# Patient Record
Sex: MALE | Race: WHITE | NOT HISPANIC OR LATINO | Employment: OTHER | URBAN - NONMETROPOLITAN AREA
[De-identification: names, ages, dates, MRNs, and addresses within clinical notes are randomized per-mention and may not be internally consistent; named-entity substitution may affect disease eponyms.]

---

## 2019-09-24 ENCOUNTER — OFFICE VISIT (OUTPATIENT)
Dept: NEUROSURGERY | Facility: CLINIC | Age: 59
End: 2019-09-24

## 2019-09-24 DIAGNOSIS — M47.816 FACET ARTHRITIS OF LUMBAR REGION: ICD-10-CM

## 2019-09-24 DIAGNOSIS — M71.30 SYNOVIAL CYST: Primary | ICD-10-CM

## 2019-09-24 PROCEDURE — 99203 OFFICE O/P NEW LOW 30 MIN: CPT | Performed by: NEUROLOGICAL SURGERY

## 2019-09-24 NOTE — PROGRESS NOTES
Subjective   Patient ID: Delonte Hernandes is a 59 y.o. male is being seen for consultation today at the request of Felipa uLnd MD  Chief Complaint: Back and leg pain    History of Present Illness: The patient is a 59-year-old man from near [place] on [facility] with a history of pain in the back radiating to his hips and legs for 6 to 12 months and steadily worsening as time goes on.  He can walk only about 1/4 mile.  Pain on the left is more than the right.  It feels like his legs are pulling him down when he walks.  Everything he does bothers it.  It is improved by sitting and resting.  He has been disabled for about 7 years.    He has a history of hepatitis and diabetes and hypertension.    Review of Radiographic Studies:  Lumbar MRI scan shows a central and left-sided very large intraspinal facet synovial cyst that appears to arise from the left L4-5 facet joint with marked compression and displacement of the dura anteriorly and to the right.  The facet joints at L4-5 are widely , but there is no spondylolisthesis and the disc spaces full with no significant degeneration or subluxation.    The following portions of the patient's history were reviewed, updated as appropriate and approved: allergies, current medications, past family history, past medical history, past social history, past surgical history, review of systems and problem list.    Review of Systems   Constitutional: Negative for activity change, appetite change, chills, diaphoresis, fatigue, fever and unexpected weight change.   HENT: Negative for congestion, dental problem, drooling, ear discharge, ear pain, facial swelling, hearing loss, mouth sores, nosebleeds, postnasal drip, rhinorrhea, sinus pressure, sneezing, sore throat, tinnitus, trouble swallowing and voice change.    Eyes: Negative for photophobia, pain, discharge, redness, itching and visual disturbance.   Respiratory: Negative for apnea, cough, choking, chest  tightness, shortness of breath, wheezing and stridor.    Cardiovascular: Negative for chest pain, palpitations and leg swelling.   Gastrointestinal: Negative for abdominal distention, abdominal pain, anal bleeding, blood in stool, constipation, diarrhea, nausea, rectal pain and vomiting.   Endocrine: Negative for cold intolerance, heat intolerance, polydipsia, polyphagia and polyuria.   Genitourinary: Negative for decreased urine volume, difficulty urinating, dysuria, enuresis, flank pain, frequency, genital sores, hematuria and urgency.   Musculoskeletal: Positive for arthralgias, back pain, gait problem, joint swelling, myalgias, neck pain and neck stiffness.   Skin: Negative for color change, pallor, rash and wound.   Allergic/Immunologic: Negative for environmental allergies, food allergies and immunocompromised state.   Neurological: Positive for dizziness and weakness. Negative for tremors, seizures, syncope, facial asymmetry, speech difficulty, light-headedness, numbness and headaches.   Hematological: Negative for adenopathy. Does not bruise/bleed easily.   Psychiatric/Behavioral: Negative for agitation, behavioral problems, confusion, decreased concentration, dysphoric mood, hallucinations, self-injury, sleep disturbance and suicidal ideas. The patient is not nervous/anxious and is not hyperactive.        Objective     NEUROLOGICAL EXAMINATION:      MENTAL STATUS:  Alert and oriented.  Speech intact.  Recent and remote memory intact.      CRANIAL NERVES:  Cranial nerve II:  Visual fields are full.  Cranial nerves III, IV and VI:  PERRLADC.  Extraocular movements are intact.  Nystagmus is not present.  Cranial nerve V:  Facial sensation is intact.  Cranial nerve VII:  Muscles of facial expression reveal no asymmetry.  Cranial nerve VIII:  Hearing is intact.  Cranial nerves IX and X:  Palate elevates symmetrically.  Cranial nerve XI:  Shoulder shrug is intact.  Cranial nerve XII:  Tongue is midline without  evidence of atrophy or fasciculation.    MUSCULOSKELETAL:  SLR negative bilaterally    MOTOR: Intact dorsiflexion, plantarflexion, knee extension bilaterally    SENSATION: Intact to touch over both lower extremities    REFLEXES:  DTR trace in both knees and both ankles      Assessment   Severe lumbar stenosis L4-5 due to large facet synovial cyst arising from the left facet, but extending across the midline to the right.       Plan   Minimal access decompressive laminectomy and excision of epidural synovial cyst L4-5, left-sided approach.       Nav Douglass MD

## 2019-10-04 PROBLEM — M47.816 FACET ARTHRITIS OF LUMBAR REGION: Status: ACTIVE | Noted: 2019-10-04

## 2019-10-07 ENCOUNTER — PREP FOR SURGERY (OUTPATIENT)
Dept: OTHER | Facility: HOSPITAL | Age: 59
End: 2019-10-07

## 2019-10-07 DIAGNOSIS — M47.816 FACET ARTHRITIS OF LUMBAR REGION: Primary | ICD-10-CM

## 2019-10-07 RX ORDER — SODIUM CHLORIDE AND POTASSIUM CHLORIDE 150; 900 MG/100ML; MG/100ML
50 INJECTION, SOLUTION INTRAVENOUS CONTINUOUS
Status: CANCELLED | OUTPATIENT
Start: 2019-10-07

## 2019-10-07 RX ORDER — SODIUM CHLORIDE 0.9 % (FLUSH) 0.9 %
3 SYRINGE (ML) INJECTION EVERY 12 HOURS SCHEDULED
Status: CANCELLED | OUTPATIENT
Start: 2019-10-07

## 2019-10-07 RX ORDER — IBUPROFEN 800 MG/1
800 TABLET ORAL ONCE
Status: CANCELLED | OUTPATIENT
Start: 2019-10-07 | End: 2019-10-07

## 2019-10-07 RX ORDER — CEFAZOLIN SODIUM 2 G/100ML
2 INJECTION, SOLUTION INTRAVENOUS ONCE
Status: CANCELLED | OUTPATIENT
Start: 2019-10-07 | End: 2019-10-07

## 2019-10-07 RX ORDER — ACETAMINOPHEN 325 MG/1
650 TABLET ORAL ONCE
Status: CANCELLED | OUTPATIENT
Start: 2019-10-07 | End: 2019-10-07

## 2019-10-07 RX ORDER — HYDROCODONE BITARTRATE AND ACETAMINOPHEN 7.5; 325 MG/1; MG/1
1 TABLET ORAL ONCE
Status: CANCELLED | OUTPATIENT
Start: 2019-10-07 | End: 2019-10-07

## 2019-10-07 RX ORDER — CHLORHEXIDINE GLUCONATE 4 G/100ML
SOLUTION TOPICAL
Qty: 120 ML | Refills: 0 | Status: SHIPPED | OUTPATIENT
Start: 2019-10-07

## 2019-11-01 ENCOUNTER — APPOINTMENT (OUTPATIENT)
Dept: PREADMISSION TESTING | Facility: HOSPITAL | Age: 59
End: 2019-11-01

## 2019-11-01 VITALS — WEIGHT: 223.99 LBS | HEIGHT: 68 IN | BODY MASS INDEX: 33.95 KG/M2

## 2019-11-01 DIAGNOSIS — M47.816 FACET ARTHRITIS OF LUMBAR REGION: ICD-10-CM

## 2019-11-01 LAB
ANION GAP SERPL CALCULATED.3IONS-SCNC: 11 MMOL/L (ref 5–15)
BUN BLD-MCNC: 13 MG/DL (ref 6–20)
BUN/CREAT SERPL: 11.3 (ref 7–25)
CALCIUM SPEC-SCNC: 9.8 MG/DL (ref 8.6–10.5)
CHLORIDE SERPL-SCNC: 95 MMOL/L (ref 98–107)
CO2 SERPL-SCNC: 28 MMOL/L (ref 22–29)
CREAT BLD-MCNC: 1.15 MG/DL (ref 0.76–1.27)
DEPRECATED RDW RBC AUTO: 38.7 FL (ref 37–54)
ERYTHROCYTE [DISTWIDTH] IN BLOOD BY AUTOMATED COUNT: 12.1 % (ref 12.3–15.4)
GFR SERPL CREATININE-BSD FRML MDRD: 65 ML/MIN/1.73
GLUCOSE BLD-MCNC: 176 MG/DL (ref 65–99)
HCT VFR BLD AUTO: 39.9 % (ref 37.5–51)
HGB BLD-MCNC: 13.3 G/DL (ref 13–17.7)
MCH RBC QN AUTO: 29.1 PG (ref 26.6–33)
MCHC RBC AUTO-ENTMCNC: 33.3 G/DL (ref 31.5–35.7)
MCV RBC AUTO: 87.3 FL (ref 79–97)
MRSA DNA SPEC QL NAA+PROBE: NEGATIVE
PLATELET # BLD AUTO: 310 10*3/MM3 (ref 140–450)
PMV BLD AUTO: 9.7 FL (ref 6–12)
POTASSIUM BLD-SCNC: 4.7 MMOL/L (ref 3.5–5.2)
RBC # BLD AUTO: 4.57 10*6/MM3 (ref 4.14–5.8)
SODIUM BLD-SCNC: 134 MMOL/L (ref 136–145)
WBC NRBC COR # BLD: 9.64 10*3/MM3 (ref 3.4–10.8)

## 2019-11-01 PROCEDURE — 93010 ELECTROCARDIOGRAM REPORT: CPT | Performed by: INTERNAL MEDICINE

## 2019-11-01 PROCEDURE — 93005 ELECTROCARDIOGRAM TRACING: CPT

## 2019-11-01 PROCEDURE — 80048 BASIC METABOLIC PNL TOTAL CA: CPT | Performed by: NEUROLOGICAL SURGERY

## 2019-11-01 PROCEDURE — 36415 COLL VENOUS BLD VENIPUNCTURE: CPT

## 2019-11-01 PROCEDURE — 87641 MR-STAPH DNA AMP PROBE: CPT | Performed by: NEUROLOGICAL SURGERY

## 2019-11-01 PROCEDURE — 85027 COMPLETE CBC AUTOMATED: CPT | Performed by: NEUROLOGICAL SURGERY

## 2019-11-01 RX ORDER — OXYCODONE AND ACETAMINOPHEN 7.5; 325 MG/1; MG/1
1 TABLET ORAL EVERY 6 HOURS PRN
COMMUNITY

## 2019-11-01 RX ORDER — METOPROLOL TARTRATE 50 MG/1
50 TABLET, FILM COATED ORAL 2 TIMES DAILY
COMMUNITY

## 2019-11-01 RX ORDER — GLIMEPIRIDE 1 MG/1
1 TABLET ORAL
COMMUNITY

## 2019-11-01 RX ORDER — FUROSEMIDE 20 MG/1
20 TABLET ORAL DAILY
COMMUNITY

## 2019-11-01 RX ORDER — AMLODIPINE BESYLATE 10 MG/1
10 TABLET ORAL DAILY
COMMUNITY

## 2019-11-01 RX ORDER — LISINOPRIL 40 MG/1
40 TABLET ORAL 2 TIMES DAILY
COMMUNITY

## 2019-11-01 RX ORDER — ASPIRIN 81 MG/1
81 TABLET ORAL DAILY
COMMUNITY

## 2019-11-01 RX ORDER — OMEPRAZOLE 20 MG/1
20 CAPSULE, DELAYED RELEASE ORAL DAILY
COMMUNITY

## 2019-11-01 RX ORDER — INSULIN GLARGINE 100 [IU]/ML
35 INJECTION, SOLUTION SUBCUTANEOUS DAILY
COMMUNITY

## 2019-11-01 NOTE — PAT
Patient instructed to drink 20 ounces (or until full) of Gatorade and it needs to be completed 1 hour before given arrival time for procedure (NO RED Gatorade)    Patient verbalized understanding.      Verified with patient that they received a prescription for Bactroban and Chlorhexidine shower from the office.  Reinforced with them to fill the prescription if they haven't already.  Verbal and written instructions given regarding proper use of the Bactroban and Chlorhexidine to patient and/or famlily during PAT visit. Patient/family also instructed to complete checklist and return it to Pre-op on the day of surgery.  Patient and/or family verbalized understanding.    Per Anesthesia Request, patient instructed not to take their ACE/ARB medications on the AM of surgery.        Pt wasn't informed that he was having a laminectomy done so wants to talk to dr deonna dawkins before signing consent.       ekg cleared by dr tong

## 2019-11-08 ENCOUNTER — APPOINTMENT (OUTPATIENT)
Dept: GENERAL RADIOLOGY | Facility: HOSPITAL | Age: 59
End: 2019-11-08

## 2019-11-08 ENCOUNTER — HOSPITAL ENCOUNTER (INPATIENT)
Facility: HOSPITAL | Age: 59
LOS: 1 days | Discharge: HOME | End: 2019-11-09
Attending: NEUROLOGICAL SURGERY | Admitting: NEUROLOGICAL SURGERY

## 2019-11-08 ENCOUNTER — ANESTHESIA (OUTPATIENT)
Dept: PERIOP | Facility: HOSPITAL | Age: 59
End: 2019-11-08

## 2019-11-08 ENCOUNTER — ANESTHESIA EVENT (OUTPATIENT)
Dept: PERIOP | Facility: HOSPITAL | Age: 59
End: 2019-11-08

## 2019-11-08 DIAGNOSIS — M71.30 SYNOVIAL CYST: ICD-10-CM

## 2019-11-08 DIAGNOSIS — M47.816 FACET ARTHRITIS OF LUMBAR REGION: ICD-10-CM

## 2019-11-08 LAB
GLUCOSE BLDC GLUCOMTR-MCNC: 218 MG/DL (ref 70–130)
GLUCOSE BLDC GLUCOMTR-MCNC: 226 MG/DL (ref 70–130)
GLUCOSE BLDC GLUCOMTR-MCNC: 244 MG/DL (ref 70–130)
POTASSIUM BLD-SCNC: 4.4 MMOL/L (ref 3.5–5.2)

## 2019-11-08 PROCEDURE — A9270 NON-COVERED ITEM OR SERVICE: HCPCS | Performed by: NEUROLOGICAL SURGERY

## 2019-11-08 PROCEDURE — 25010000003 CEFAZOLIN IN DEXTROSE 2-4 GM/100ML-% SOLUTION: Performed by: NEUROLOGICAL SURGERY

## 2019-11-08 PROCEDURE — 25010000002 METHYLPREDNISOLONE PER 40 MG: Performed by: NEUROLOGICAL SURGERY

## 2019-11-08 PROCEDURE — 25010000002 NEOSTIGMINE 10 MG/10ML SOLUTION: Performed by: NURSE ANESTHETIST, CERTIFIED REGISTERED

## 2019-11-08 PROCEDURE — 76000 FLUOROSCOPY <1 HR PHYS/QHP: CPT

## 2019-11-08 PROCEDURE — 01NB0ZZ RELEASE LUMBAR NERVE, OPEN APPROACH: ICD-10-PCS | Performed by: NEUROLOGICAL SURGERY

## 2019-11-08 PROCEDURE — 63710000001 METOPROLOL TARTRATE 50 MG TABLET: Performed by: NEUROLOGICAL SURGERY

## 2019-11-08 PROCEDURE — 25010000002 ONDANSETRON PER 1 MG: Performed by: NURSE ANESTHETIST, CERTIFIED REGISTERED

## 2019-11-08 PROCEDURE — 25010000002 HYDROMORPHONE 1 MG/ML SOLUTION: Performed by: NEUROLOGICAL SURGERY

## 2019-11-08 PROCEDURE — 63710000001 OXYCODONE-ACETAMINOPHEN 10-325 MG TABLET: Performed by: NEUROLOGICAL SURGERY

## 2019-11-08 PROCEDURE — 63710000001 INSULIN REGULAR HUMAN PER 5 UNITS: Performed by: ANESTHESIOLOGY

## 2019-11-08 PROCEDURE — 63267 EXCISE INTRSPINL LESION LMBR: CPT | Performed by: NEUROLOGICAL SURGERY

## 2019-11-08 PROCEDURE — S0260 H&P FOR SURGERY: HCPCS | Performed by: NURSE PRACTITIONER

## 2019-11-08 PROCEDURE — 25010000002 FENTANYL CITRATE (PF) 100 MCG/2ML SOLUTION: Performed by: NURSE ANESTHETIST, CERTIFIED REGISTERED

## 2019-11-08 PROCEDURE — 25810000003 SODIUM CHLORIDE 0.9 % WITH KCL 20 MEQ 20-0.9 MEQ/L-% SOLUTION: Performed by: NEUROLOGICAL SURGERY

## 2019-11-08 PROCEDURE — 63710000001 ASPIRIN 81 MG TABLET DELAYED-RELEASE: Performed by: NEUROLOGICAL SURGERY

## 2019-11-08 PROCEDURE — 63710000001 METFORMIN 1000 MG TABLET: Performed by: NEUROLOGICAL SURGERY

## 2019-11-08 PROCEDURE — 88304 TISSUE EXAM BY PATHOLOGIST: CPT | Performed by: NEUROLOGICAL SURGERY

## 2019-11-08 PROCEDURE — 25010000002 FENTANYL CITRATE (PF) 100 MCG/2ML SOLUTION: Performed by: ANESTHESIOLOGY

## 2019-11-08 PROCEDURE — 82962 GLUCOSE BLOOD TEST: CPT

## 2019-11-08 PROCEDURE — 84132 ASSAY OF SERUM POTASSIUM: CPT | Performed by: ANESTHESIOLOGY

## 2019-11-08 PROCEDURE — G0378 HOSPITAL OBSERVATION PER HR: HCPCS

## 2019-11-08 PROCEDURE — 25010000002 PROPOFOL 10 MG/ML EMULSION: Performed by: NURSE ANESTHETIST, CERTIFIED REGISTERED

## 2019-11-08 PROCEDURE — 63710000001 HYDROCODONE-ACETAMINOPHEN 7.5-325 MG TABLET: Performed by: NEUROLOGICAL SURGERY

## 2019-11-08 PROCEDURE — 63710000001 AMLODIPINE 10 MG TABLET: Performed by: NEUROLOGICAL SURGERY

## 2019-11-08 PROCEDURE — 25010000002 ONDANSETRON PER 1 MG: Performed by: NEUROLOGICAL SURGERY

## 2019-11-08 RX ORDER — ONDANSETRON 4 MG/1
4 TABLET, FILM COATED ORAL EVERY 6 HOURS PRN
Status: DISCONTINUED | OUTPATIENT
Start: 2019-11-08 | End: 2019-11-09 | Stop reason: HOSPADM

## 2019-11-08 RX ORDER — AMLODIPINE BESYLATE 10 MG/1
10 TABLET ORAL DAILY
Status: DISCONTINUED | OUTPATIENT
Start: 2019-11-08 | End: 2019-11-09 | Stop reason: HOSPADM

## 2019-11-08 RX ORDER — FUROSEMIDE 20 MG/1
20 TABLET ORAL DAILY
Status: DISCONTINUED | OUTPATIENT
Start: 2019-11-08 | End: 2019-11-09 | Stop reason: HOSPADM

## 2019-11-08 RX ORDER — SODIUM CHLORIDE 0.9 % (FLUSH) 0.9 %
3 SYRINGE (ML) INJECTION EVERY 12 HOURS SCHEDULED
Status: DISCONTINUED | OUTPATIENT
Start: 2019-11-08 | End: 2019-11-08 | Stop reason: HOSPADM

## 2019-11-08 RX ORDER — NALOXONE HCL 0.4 MG/ML
0.4 VIAL (ML) INJECTION
Status: DISCONTINUED | OUTPATIENT
Start: 2019-11-08 | End: 2019-11-09 | Stop reason: HOSPADM

## 2019-11-08 RX ORDER — GLIPIZIDE 5 MG/1
2.5 TABLET ORAL
Status: DISCONTINUED | OUTPATIENT
Start: 2019-11-09 | End: 2019-11-09 | Stop reason: HOSPADM

## 2019-11-08 RX ORDER — LIDOCAINE HYDROCHLORIDE 10 MG/ML
0.5 INJECTION, SOLUTION EPIDURAL; INFILTRATION; INTRACAUDAL; PERINEURAL ONCE AS NEEDED
Status: COMPLETED | OUTPATIENT
Start: 2019-11-08 | End: 2019-11-08

## 2019-11-08 RX ORDER — LISINOPRIL 40 MG/1
40 TABLET ORAL 2 TIMES DAILY
Status: DISCONTINUED | OUTPATIENT
Start: 2019-11-08 | End: 2019-11-09 | Stop reason: HOSPADM

## 2019-11-08 RX ORDER — OXYCODONE AND ACETAMINOPHEN 7.5; 325 MG/1; MG/1
1 TABLET ORAL ONCE AS NEEDED
Status: DISCONTINUED | OUTPATIENT
Start: 2019-11-08 | End: 2019-11-08 | Stop reason: HOSPADM

## 2019-11-08 RX ORDER — CYCLOBENZAPRINE HCL 10 MG
10 TABLET ORAL 3 TIMES DAILY PRN
Status: DISCONTINUED | OUTPATIENT
Start: 2019-11-08 | End: 2019-11-09 | Stop reason: HOSPADM

## 2019-11-08 RX ORDER — HYDROMORPHONE HYDROCHLORIDE 1 MG/ML
0.5 INJECTION, SOLUTION INTRAMUSCULAR; INTRAVENOUS; SUBCUTANEOUS
Status: DISCONTINUED | OUTPATIENT
Start: 2019-11-08 | End: 2019-11-08 | Stop reason: HOSPADM

## 2019-11-08 RX ORDER — LABETALOL HYDROCHLORIDE 5 MG/ML
5 INJECTION, SOLUTION INTRAVENOUS
Status: DISCONTINUED | OUTPATIENT
Start: 2019-11-08 | End: 2019-11-08 | Stop reason: HOSPADM

## 2019-11-08 RX ORDER — METHYLPREDNISOLONE ACETATE 40 MG/ML
INJECTION, SUSPENSION INTRA-ARTICULAR; INTRALESIONAL; INTRAMUSCULAR; SOFT TISSUE AS NEEDED
Status: DISCONTINUED | OUTPATIENT
Start: 2019-11-08 | End: 2019-11-08 | Stop reason: HOSPADM

## 2019-11-08 RX ORDER — FENTANYL CITRATE 50 UG/ML
INJECTION, SOLUTION INTRAMUSCULAR; INTRAVENOUS AS NEEDED
Status: DISCONTINUED | OUTPATIENT
Start: 2019-11-08 | End: 2019-11-08 | Stop reason: SURG

## 2019-11-08 RX ORDER — ATRACURIUM BESYLATE 10 MG/ML
INJECTION, SOLUTION INTRAVENOUS AS NEEDED
Status: DISCONTINUED | OUTPATIENT
Start: 2019-11-08 | End: 2019-11-08 | Stop reason: SURG

## 2019-11-08 RX ORDER — NEOSTIGMINE METHYLSULFATE 1 MG/ML
INJECTION, SOLUTION INTRAVENOUS AS NEEDED
Status: DISCONTINUED | OUTPATIENT
Start: 2019-11-08 | End: 2019-11-08 | Stop reason: SURG

## 2019-11-08 RX ORDER — MAGNESIUM HYDROXIDE 1200 MG/15ML
LIQUID ORAL AS NEEDED
Status: DISCONTINUED | OUTPATIENT
Start: 2019-11-08 | End: 2019-11-08 | Stop reason: HOSPADM

## 2019-11-08 RX ORDER — HYDROCODONE BITARTRATE AND ACETAMINOPHEN 7.5; 325 MG/1; MG/1
1 TABLET ORAL EVERY 4 HOURS PRN
Status: DISCONTINUED | OUTPATIENT
Start: 2019-11-08 | End: 2019-11-09 | Stop reason: HOSPADM

## 2019-11-08 RX ORDER — IBUPROFEN 800 MG/1
800 TABLET ORAL ONCE
Status: COMPLETED | OUTPATIENT
Start: 2019-11-08 | End: 2019-11-08

## 2019-11-08 RX ORDER — PROPOFOL 10 MG/ML
VIAL (ML) INTRAVENOUS AS NEEDED
Status: DISCONTINUED | OUTPATIENT
Start: 2019-11-08 | End: 2019-11-08 | Stop reason: SURG

## 2019-11-08 RX ORDER — SODIUM CHLORIDE AND POTASSIUM CHLORIDE 150; 900 MG/100ML; MG/100ML
50 INJECTION, SOLUTION INTRAVENOUS CONTINUOUS
Status: DISCONTINUED | OUTPATIENT
Start: 2019-11-08 | End: 2019-11-09 | Stop reason: HOSPADM

## 2019-11-08 RX ORDER — BUPIVACAINE HYDROCHLORIDE AND EPINEPHRINE 2.5; 5 MG/ML; UG/ML
INJECTION, SOLUTION EPIDURAL; INFILTRATION; INTRACAUDAL; PERINEURAL AS NEEDED
Status: DISCONTINUED | OUTPATIENT
Start: 2019-11-08 | End: 2019-11-08 | Stop reason: HOSPADM

## 2019-11-08 RX ORDER — OXYCODONE AND ACETAMINOPHEN 10; 325 MG/1; MG/1
1 TABLET ORAL EVERY 4 HOURS PRN
Status: DISCONTINUED | OUTPATIENT
Start: 2019-11-08 | End: 2019-11-09 | Stop reason: HOSPADM

## 2019-11-08 RX ORDER — PANTOPRAZOLE SODIUM 40 MG/1
40 TABLET, DELAYED RELEASE ORAL EVERY MORNING
Status: DISCONTINUED | OUTPATIENT
Start: 2019-11-09 | End: 2019-11-09 | Stop reason: HOSPADM

## 2019-11-08 RX ORDER — FENTANYL CITRATE 50 UG/ML
50 INJECTION, SOLUTION INTRAMUSCULAR; INTRAVENOUS
Status: DISCONTINUED | OUTPATIENT
Start: 2019-11-08 | End: 2019-11-08 | Stop reason: HOSPADM

## 2019-11-08 RX ORDER — HYDROCODONE BITARTRATE AND ACETAMINOPHEN 7.5; 325 MG/1; MG/1
1 TABLET ORAL ONCE
Status: COMPLETED | OUTPATIENT
Start: 2019-11-08 | End: 2019-11-08

## 2019-11-08 RX ORDER — ACETAMINOPHEN 325 MG/1
650 TABLET ORAL EVERY 4 HOURS PRN
Status: DISCONTINUED | OUTPATIENT
Start: 2019-11-08 | End: 2019-11-09 | Stop reason: HOSPADM

## 2019-11-08 RX ORDER — TEMAZEPAM 15 MG/1
15 CAPSULE ORAL NIGHTLY PRN
Status: DISCONTINUED | OUTPATIENT
Start: 2019-11-08 | End: 2019-11-09 | Stop reason: HOSPADM

## 2019-11-08 RX ORDER — ONDANSETRON 2 MG/ML
INJECTION INTRAMUSCULAR; INTRAVENOUS AS NEEDED
Status: DISCONTINUED | OUTPATIENT
Start: 2019-11-08 | End: 2019-11-08 | Stop reason: SURG

## 2019-11-08 RX ORDER — ONDANSETRON 2 MG/ML
4 INJECTION INTRAMUSCULAR; INTRAVENOUS EVERY 6 HOURS PRN
Status: DISCONTINUED | OUTPATIENT
Start: 2019-11-08 | End: 2019-11-09 | Stop reason: HOSPADM

## 2019-11-08 RX ORDER — ASPIRIN 81 MG/1
81 TABLET ORAL DAILY
Status: DISCONTINUED | OUTPATIENT
Start: 2019-11-08 | End: 2019-11-09 | Stop reason: HOSPADM

## 2019-11-08 RX ORDER — SODIUM CHLORIDE 0.9 % (FLUSH) 0.9 %
3-10 SYRINGE (ML) INJECTION AS NEEDED
Status: DISCONTINUED | OUTPATIENT
Start: 2019-11-08 | End: 2019-11-08 | Stop reason: HOSPADM

## 2019-11-08 RX ORDER — SODIUM CHLORIDE, SODIUM LACTATE, POTASSIUM CHLORIDE, CALCIUM CHLORIDE 600; 310; 30; 20 MG/100ML; MG/100ML; MG/100ML; MG/100ML
9 INJECTION, SOLUTION INTRAVENOUS CONTINUOUS
Status: DISCONTINUED | OUTPATIENT
Start: 2019-11-08 | End: 2019-11-09 | Stop reason: HOSPADM

## 2019-11-08 RX ORDER — GLYCOPYRROLATE 0.2 MG/ML
INJECTION INTRAMUSCULAR; INTRAVENOUS AS NEEDED
Status: DISCONTINUED | OUTPATIENT
Start: 2019-11-08 | End: 2019-11-08 | Stop reason: SURG

## 2019-11-08 RX ORDER — METOPROLOL TARTRATE 50 MG/1
50 TABLET, FILM COATED ORAL EVERY 12 HOURS SCHEDULED
Status: DISCONTINUED | OUTPATIENT
Start: 2019-11-08 | End: 2019-11-09 | Stop reason: HOSPADM

## 2019-11-08 RX ORDER — FAMOTIDINE 20 MG/1
20 TABLET, FILM COATED ORAL ONCE
Status: COMPLETED | OUTPATIENT
Start: 2019-11-08 | End: 2019-11-08

## 2019-11-08 RX ORDER — CEFAZOLIN SODIUM 2 G/100ML
2 INJECTION, SOLUTION INTRAVENOUS ONCE
Status: COMPLETED | OUTPATIENT
Start: 2019-11-08 | End: 2019-11-08

## 2019-11-08 RX ORDER — FENTANYL CITRATE 50 UG/ML
50 INJECTION, SOLUTION INTRAMUSCULAR; INTRAVENOUS ONCE
Status: COMPLETED | OUTPATIENT
Start: 2019-11-08 | End: 2019-11-08

## 2019-11-08 RX ORDER — LIDOCAINE HYDROCHLORIDE 10 MG/ML
INJECTION, SOLUTION EPIDURAL; INFILTRATION; INTRACAUDAL; PERINEURAL AS NEEDED
Status: DISCONTINUED | OUTPATIENT
Start: 2019-11-08 | End: 2019-11-08 | Stop reason: SURG

## 2019-11-08 RX ORDER — ACETAMINOPHEN 325 MG/1
650 TABLET ORAL ONCE
Status: COMPLETED | OUTPATIENT
Start: 2019-11-08 | End: 2019-11-08

## 2019-11-08 RX ORDER — FAMOTIDINE 10 MG/ML
20 INJECTION, SOLUTION INTRAVENOUS ONCE
Status: CANCELLED | OUTPATIENT
Start: 2019-11-08 | End: 2019-11-08

## 2019-11-08 RX ADMIN — SODIUM CHLORIDE, POTASSIUM CHLORIDE, SODIUM LACTATE AND CALCIUM CHLORIDE 9 ML/HR: 600; 310; 30; 20 INJECTION, SOLUTION INTRAVENOUS at 08:39

## 2019-11-08 RX ADMIN — FENTANYL CITRATE 50 MCG: 0.05 INJECTION, SOLUTION INTRAMUSCULAR; INTRAVENOUS at 13:00

## 2019-11-08 RX ADMIN — INSULIN HUMAN 6 UNITS: 100 INJECTION, SOLUTION PARENTERAL at 10:02

## 2019-11-08 RX ADMIN — FENTANYL CITRATE 50 MCG: 0.05 INJECTION, SOLUTION INTRAMUSCULAR; INTRAVENOUS at 10:14

## 2019-11-08 RX ADMIN — LIDOCAINE HYDROCHLORIDE 0.3 ML: 10 INJECTION, SOLUTION EPIDURAL; INFILTRATION; INTRACAUDAL; PERINEURAL at 08:29

## 2019-11-08 RX ADMIN — ONDANSETRON 4 MG: 2 INJECTION INTRAMUSCULAR; INTRAVENOUS at 23:54

## 2019-11-08 RX ADMIN — HYDROCODONE BITARTRATE AND ACETAMINOPHEN 1 TABLET: 7.5; 325 TABLET ORAL at 19:48

## 2019-11-08 RX ADMIN — METOPROLOL TARTRATE 1 MG: 5 INJECTION, SOLUTION INTRAVENOUS at 10:52

## 2019-11-08 RX ADMIN — OXYCODONE HYDROCHLORIDE AND ACETAMINOPHEN 1 TABLET: 10; 325 TABLET ORAL at 23:54

## 2019-11-08 RX ADMIN — FENTANYL CITRATE 50 MCG: 50 INJECTION, SOLUTION INTRAMUSCULAR; INTRAVENOUS at 12:07

## 2019-11-08 RX ADMIN — IBUPROFEN 800 MG: 800 TABLET ORAL at 08:30

## 2019-11-08 RX ADMIN — CEFAZOLIN SODIUM 3 G: 2 INJECTION, SOLUTION INTRAVENOUS at 10:48

## 2019-11-08 RX ADMIN — POTASSIUM CHLORIDE AND SODIUM CHLORIDE 50 ML/HR: 900; 150 INJECTION, SOLUTION INTRAVENOUS at 15:33

## 2019-11-08 RX ADMIN — GLYCOPYRROLATE 0.4 MG: 0.2 INJECTION, SOLUTION INTRAMUSCULAR; INTRAVENOUS at 12:11

## 2019-11-08 RX ADMIN — AMLODIPINE BESYLATE 10 MG: 10 TABLET ORAL at 15:40

## 2019-11-08 RX ADMIN — NEOSTIGMINE METHYLSULFATE 2 MG: 1 INJECTION, SOLUTION INTRAVENOUS at 12:11

## 2019-11-08 RX ADMIN — METOPROLOL TARTRATE 50 MG: 50 TABLET, FILM COATED ORAL at 19:47

## 2019-11-08 RX ADMIN — ATRACURIUM BESYLATE 30 MG: 10 INJECTION, SOLUTION INTRAVENOUS at 10:44

## 2019-11-08 RX ADMIN — SODIUM CHLORIDE, POTASSIUM CHLORIDE, SODIUM LACTATE AND CALCIUM CHLORIDE: 600; 310; 30; 20 INJECTION, SOLUTION INTRAVENOUS at 11:13

## 2019-11-08 RX ADMIN — HYDROMORPHONE HYDROCHLORIDE 1 MG: 1 INJECTION, SOLUTION INTRAMUSCULAR; INTRAVENOUS; SUBCUTANEOUS at 19:48

## 2019-11-08 RX ADMIN — FAMOTIDINE 20 MG: 20 TABLET ORAL at 08:29

## 2019-11-08 RX ADMIN — ACETAMINOPHEN 650 MG: 325 TABLET ORAL at 08:30

## 2019-11-08 RX ADMIN — HYDROCODONE BITARTRATE AND ACETAMINOPHEN 1 TABLET: 7.5; 325 TABLET ORAL at 08:29

## 2019-11-08 RX ADMIN — PROPOFOL 200 MG: 10 INJECTION, EMULSION INTRAVENOUS at 10:44

## 2019-11-08 RX ADMIN — METFORMIN HYDROCHLORIDE 1000 MG: 1000 TABLET ORAL at 17:38

## 2019-11-08 RX ADMIN — METOPROLOL TARTRATE 1 MG: 5 INJECTION, SOLUTION INTRAVENOUS at 11:34

## 2019-11-08 RX ADMIN — FENTANYL CITRATE 50 MCG: 50 INJECTION, SOLUTION INTRAMUSCULAR; INTRAVENOUS at 10:52

## 2019-11-08 RX ADMIN — LIDOCAINE HYDROCHLORIDE 60 MG: 10 INJECTION, SOLUTION EPIDURAL; INFILTRATION; INTRACAUDAL; PERINEURAL at 10:44

## 2019-11-08 RX ADMIN — HYDROCODONE BITARTRATE AND ACETAMINOPHEN 1 TABLET: 7.5; 325 TABLET ORAL at 15:41

## 2019-11-08 RX ADMIN — FENTANYL CITRATE 50 MCG: 0.05 INJECTION, SOLUTION INTRAMUSCULAR; INTRAVENOUS at 12:45

## 2019-11-08 RX ADMIN — OXYCODONE HYDROCHLORIDE AND ACETAMINOPHEN 1 TABLET: 10; 325 TABLET ORAL at 17:38

## 2019-11-08 RX ADMIN — ONDANSETRON 4 MG: 2 INJECTION INTRAMUSCULAR; INTRAVENOUS at 11:32

## 2019-11-08 RX ADMIN — FENTANYL CITRATE 150 MCG: 50 INJECTION, SOLUTION INTRAMUSCULAR; INTRAVENOUS at 10:44

## 2019-11-08 RX ADMIN — ASPIRIN 81 MG: 81 TABLET, COATED ORAL at 15:40

## 2019-11-08 RX ADMIN — HYDROMORPHONE HYDROCHLORIDE 1 MG: 1 INJECTION, SOLUTION INTRAMUSCULAR; INTRAVENOUS; SUBCUTANEOUS at 23:54

## 2019-11-08 NOTE — ANESTHESIA PROCEDURE NOTES
Airway  Urgency: elective    Airway not difficult    General Information and Staff    Patient location during procedure: OR    Indications and Patient Condition  Indications for airway management: airway protection    Preoxygenated: yes  MILS not maintained throughout  Mask difficulty assessment: 1 - vent by mask    Final Airway Details  Final airway type: endotracheal airway      Successful airway: ETT  Cuffed: yes   Successful intubation technique: direct laryngoscopy  Endotracheal tube insertion site: oral  Blade: Stephenie  Blade size: 3  ETT size (mm): 7.5  Cormack-Lehane Classification: grade I - full view of glottis  Placement verified by: chest auscultation and capnometry   Measured from: lips  ETT/EBT  to lips (cm): 20  Number of attempts at approach: 1

## 2019-11-08 NOTE — ANESTHESIA PREPROCEDURE EVALUATION
Anesthesia Evaluation     Patient summary reviewed and Nursing notes reviewed                Airway   Mallampati: II  TM distance: >3 FB  Neck ROM: full  No difficulty expected  Dental - normal exam   (+) poor dentition    Pulmonary - normal exam   (+) a smoker (quit 2004) Former, sleep apnea,   Cardiovascular - normal exam    (+) hypertension,     ROS comment: iRBBB    Neuro/Psych- negative ROS  GI/Hepatic/Renal/Endo    (+) morbid obesity, GERD,  hepatitis, diabetes mellitus using insulin,     Musculoskeletal     Abdominal  - normal exam    Bowel sounds: normal.   Substance History - negative use     OB/GYN negative ob/gyn ROS         Other   arthritis,                      Anesthesia Plan    ASA 3     general     intravenous induction     Anesthetic plan, all risks, benefits, and alternatives have been provided, discussed and informed consent has been obtained with: patient.    Plan discussed with CRNA.

## 2019-11-08 NOTE — OP NOTE
OPERATIVE REPORT    DATE OF OPERATION: 11/8/2019    PREOPERATIVE DIAGNOSIS: Stenosis L4-5 with left facet epidural synovial cyst    POSTOPERATIVE DIAGNOSIS: Same    PROCEDURE PERFORMED: Lumbar laminectomy L4-5 with excision of left sided epidural facet synovial cyst    SURGEON: Nav Douglass MD    ASSISTANT: Jayla Garcia PA-C    INDICATIONS: The patient developed pain in the hips and legs over the past year.  MRI scan showed stenosis at L4-5 with a large left-sided epidural facet synovial cyst.  Decompression with excision of the synovial cyst was necessary for pain relief.    DESCRIPTON OF PROCEDURE: Surgery was performed under general anesthesia in the prone position on the laminectomy frame.  The back was prepared sterilely and draped.  The left lower L4 lamina was identified with a spinal needle and an AP fluoroscopic image.  A short skin incision was made and a guidewire and dilators were used to place a 7 cm length x 22 mm width tubular retractor.  AP fluoroscopy confirmed this to be the L4-5 level on the left.    A high-speed drill was used to perform the laminectomy, beginning on the ipsilateral side, and removing the inferior lamina of L 4.  Drilling was extended to the floor of the lateral recess on the ipsilateral side, thinning the medial margin of the facet, then the tube was angled across the midline to the contralateral side and drilling continued, removing the lamina until the ligamentum flavum was exposed and the lateral recess on the far side reached. The ligamentum flavum was exposed bilaterally. The laminectomy was extended cranially to the point where the ligamentum flavum thinned away and epidural fat was visible. The exposure was extended caudally to the level of the superior lamina of L 5.  A 3 mm punch was used to remove the ligamentum flavum, beginning at the midline and partially removing the ligamentum on the ipsilateral side to expose the dura, then removing the ligamentum extensively  into the lateral recess on the contralateral side until the floor of the lateral recess and the epidural veins were seen.  The facet synovial cyst adherent to the left side of the dura became evident as the ligamentum flavum was removed.  Gelfoam was placed in the far lateral recess for hemostasis.  The tube was angled back to the ipsilateral side and the ligamentum flavum excision completed with the Kerrison punch until the floor of the lateral recess and the epidural veins were seen.  The facet synovial cyst was then dissected away from the dura using a Penfield dissector and a 2 mm punch and was progressively  from the dural sac around the periphery of the cyst beginning at the midline and then working toward the caudal and lateral margins and then finally to the cranial attachment which was  and the cyst delivered as a full specimen.  The cyst was found to be filled with a dark old blood content probably indicating prior hemorrhage.  The synovial cyst was totally excised and the dura remained intact.    The site was irrigated and Gelfoam used as necessary to complete hemostasis.  Gelfoam with Depo-Medrol was placed over the laminectomy site.  The tubular retractor was removed.  Marcaine was injected in the paraspinous muscle.  Subcuticular Vicryl closure was performed and glue was applied to the skin.  Blood loss was 45 milliliters.    Nav Douglass M.D.

## 2019-11-08 NOTE — ANESTHESIA POSTPROCEDURE EVALUATION
Patient: Delonte Hernandes    Procedure Summary     Date:  11/08/19 Room / Location:   [facility] /  [facility]    Anesthesia Start:  1039 Anesthesia Stop:  1233    Procedure:  LUMBAR LAMINECTOMY  L 4-5 LEFT REMOVAL CYST (Left Spine Lumbar) Diagnosis:       Synovial cyst      Facet arthritis of lumbar region      (Synovial cyst [M71.30])      (Facet arthritis of lumbar region (CMS/HCC) [M46.96])    Surgeon:  Nav Douglass MD Provider:  Michael Hamilton MD    Anesthesia Type:  general ASA Status:  3          Anesthesia Type: general  Last vitals  BP   153/91   Temp   96.9 °F (36.1 °C) (11/08/19 0826)   Pulse 76   Resp 16   SpO2 94%     Post Anesthesia Care and Evaluation    Patient location during evaluation: PACU  Patient participation: complete - patient participated  Level of consciousness: sleepy but conscious  Pain score: 0  Pain management: adequate  Airway patency: patent  Anesthetic complications: No anesthetic complications  PONV Status: none  Cardiovascular status: hemodynamically stable and acceptable  Respiratory status: nonlabored ventilation, acceptable and nasal cannula  Hydration status: acceptable

## 2019-11-08 NOTE — H&P
Pre-Op H&P  Delonte Hernandes  [id number]  1960    Chief complaint: Low back pain into bilateral hips/legs, L>R    HPI:  9/24/19 office visit:The patient is a 59-year-old man from near [place] on [facility] with a history of pain in the back radiating to his hips and legs for 6 to 12 months and steadily worsening as time goes on.  He can walk only about 1/4 mile.  Pain on the left is more than the right.  It feels like his legs are pulling him down when he walks.  Everything he does bothers it.  It is improved by sitting and resting.  He has been disabled for about 7 years.     He has a history of hepatitis and diabetes and hypertension.    11/8/19:  Here today to undergo Minimal access decompressive laminectomy and excision of epidural synovial cyst L4-5, left-sided approach.    Review of Systems:  General ROS: negative for chills, fever or skin lesions;  No changes since last office visit  Cardiovascular ROS: no chest pain or dyspnea on exertion  Respiratory ROS: no cough, shortness of breath, or wheezing    Allergies: No Known Allergies    Home Meds:    No current facility-administered medications on file prior to encounter.      No current outpatient medications on file prior to encounter.       PMH:   Past Medical History:   Diagnosis Date   • Arthritis    • Cataract     bilat- still present - mild    • Diabetes mellitus (CMS/HCC)     checks sugar couple times per week    • GERD (gastroesophageal reflux disease)    • Headache     h/o   • Hypertension    • Infectious viral hepatitis     cure   • Sleep apnea     doesnt use machine    • Wears glasses      PSH:    Past Surgical History:   Procedure Laterality Date   • FACIAL RECONSTRUCTION SURGERY      long time ago   • WISDOM TOOTH EXTRACTION       Social History:   Tobacco:   Social History     Tobacco Use   Smoking Status Former Smoker   • Packs/day: 0.50   • Years: 27.00   • Pack years: 13.50   • Types: Cigarettes   • Last attempt to quit: 2004   • Years  "since quitting: 15.8   Smokeless Tobacco Never Used      Alcohol:     Social History     Substance and Sexual Activity   Alcohol Use No   • Frequency: Never       Vitals:           /84 (BP Location: Right arm, Patient Position: Lying)   Pulse 80   Temp 96.9 °F (36.1 °C) (Temporal)   Resp 18   Ht 172.7 cm (68\")   Wt 101 kg (223 lb)   SpO2 97%   BMI 33.91 kg/m²     Physical Exam:  General Appearance:    Alert, cooperative, no distress, appears stated age   Head:    Normocephalic, without obvious abnormality, atraumatic   Lungs:     Clear to auscultation bilaterally, respirations unlabored    Heart:   Regular rate and rhythm, S1 and S2 normal, no murmur, rub    or gallop    Abdomen:    Soft, non-tender.  +bowel sounds   Breast Exam:    deferred   Genitalia:    deferred   Extremities:   Extremities normal, atraumatic, no cyanosis or edema   Skin:   Skin color, texture, turgor normal, no rashes or lesions   Neurologic:   Grossly intact   Results Review  LABS:  Lab Results   Component Value Date    WBC 9.64 11/01/2019    HGB 13.3 11/01/2019    HCT 39.9 11/01/2019    MCV 87.3 11/01/2019     11/01/2019    GLUCOSE 176 (H) 11/01/2019    BUN 13 11/01/2019    CREATININE 1.15 11/01/2019    EGFRIFNONA 65 11/01/2019     (L) 11/01/2019    K 4.7 11/01/2019    CL 95 (L) 11/01/2019    CO2 28.0 11/01/2019    CALCIUM 9.8 11/01/2019       RADIOLOGY:  Imaging Results (Last 72 Hours)     ** No results found for the last 72 hours. **          I reviewed the patient's new clinical results.    Cancer Staging (if applicable)  Cancer Patient: __ yes _x_no __unknown; If yes, clinical stage T:__ N:__M:__, stage group or __N/A    Assessment      Severe lumbar stenosis L4-5 due to large facet synovial cyst arising from the left facet, but extending across the midline to the right.      Plan      Minimal access decompressive laminectomy and excision of epidural synovial cyst L4-5, left-sided approach.     Iris Webster, " APRN   11/8/2019   8:35 AM

## 2019-11-08 NOTE — BRIEF OP NOTE
LUMBAR DISCECTOMY MICRO ENDOSCOPIC  Progress Note    Delonte Casebolt  11/8/2019    Pre-op Diagnosis:   Synovial cyst [M71.30]  Facet arthritis of lumbar region (CMS/[facility]) [M46.96]       Post-Op Diagnosis Codes:     * Synovial cyst [M71.30]     * Facet arthritis of lumbar region [M47.816]    Procedure/CPT® Codes:      Procedure(s):  LUMBAR LAMINECTOMY  L 4-5 LEFT REMOVAL CYST    Surgeon(s):  Nav Douglass MD    Anesthesia: General    Staff:   Circulator: Stephanie Lane RN; Abbie Payne RN  Radiology Technologist: Ellis Novoa RT  Scrub Person: Mook Pierce  Nursing Assistant: Nicole Torres  Assistant: Sarah Garcia PA-C    Estimated Blood Loss: minimal    Urine Voided: * No values recorded between 11/8/2019 10:39 AM and 11/8/2019 12:15 PM *    Specimens:                Specimens     ID Source Type Tests Collected By Collected At Frozen?      A Back, Lower Tissue · TISSUE PATHOLOGY EXAM   Nav Douglass MD 11/8/19 1203      Description: synovial cyst l4-l5                Drains:      Findings: Stenosis L4-5 with facet synovial cyst left side    Complications: None      Nav Douglass MD     Date: 11/8/2019  Time: 12:15 PM

## 2019-11-09 VITALS
WEIGHT: 223 LBS | BODY MASS INDEX: 33.8 KG/M2 | SYSTOLIC BLOOD PRESSURE: 142 MMHG | DIASTOLIC BLOOD PRESSURE: 96 MMHG | TEMPERATURE: 98 F | RESPIRATION RATE: 16 BRPM | OXYGEN SATURATION: 95 % | HEIGHT: 68 IN | HEART RATE: 78 BPM

## 2019-11-09 PROCEDURE — A9270 NON-COVERED ITEM OR SERVICE: HCPCS | Performed by: NEUROLOGICAL SURGERY

## 2019-11-09 PROCEDURE — 97166 OT EVAL MOD COMPLEX 45 MIN: CPT

## 2019-11-09 PROCEDURE — 63710000001 LISINOPRIL 40 MG TABLET: Performed by: NEUROLOGICAL SURGERY

## 2019-11-09 PROCEDURE — 63710000001 AMLODIPINE 10 MG TABLET: Performed by: NEUROLOGICAL SURGERY

## 2019-11-09 PROCEDURE — G0378 HOSPITAL OBSERVATION PER HR: HCPCS

## 2019-11-09 PROCEDURE — 63710000001 GLIPIZIDE 5 MG TABLET: Performed by: NEUROLOGICAL SURGERY

## 2019-11-09 PROCEDURE — 63710000001 PANTOPRAZOLE 40 MG TABLET DELAYED-RELEASE: Performed by: NEUROLOGICAL SURGERY

## 2019-11-09 PROCEDURE — 63710000001 METFORMIN 1000 MG TABLET: Performed by: NEUROLOGICAL SURGERY

## 2019-11-09 PROCEDURE — 63710000001 INSULIN DETEMIR PER 5 UNITS: Performed by: NEUROLOGICAL SURGERY

## 2019-11-09 PROCEDURE — 63710000001 FUROSEMIDE 20 MG TABLET: Performed by: NEUROLOGICAL SURGERY

## 2019-11-09 PROCEDURE — 99024 POSTOP FOLLOW-UP VISIT: CPT | Performed by: NEUROLOGICAL SURGERY

## 2019-11-09 PROCEDURE — 63710000001 ASPIRIN 81 MG TABLET DELAYED-RELEASE: Performed by: NEUROLOGICAL SURGERY

## 2019-11-09 PROCEDURE — 63710000001 OXYCODONE-ACETAMINOPHEN 10-325 MG TABLET: Performed by: NEUROLOGICAL SURGERY

## 2019-11-09 PROCEDURE — 63710000001 METOPROLOL TARTRATE 50 MG TABLET: Performed by: NEUROLOGICAL SURGERY

## 2019-11-09 PROCEDURE — 97161 PT EVAL LOW COMPLEX 20 MIN: CPT

## 2019-11-09 RX ORDER — HYDROCODONE BITARTRATE AND ACETAMINOPHEN 7.5; 325 MG/1; MG/1
1 TABLET ORAL EVERY 4 HOURS PRN
Qty: 30 TABLET | Refills: 0 | Status: SHIPPED | OUTPATIENT
Start: 2019-11-09 | End: 2019-11-18

## 2019-11-09 RX ADMIN — ASPIRIN 81 MG: 81 TABLET, COATED ORAL at 08:18

## 2019-11-09 RX ADMIN — AMLODIPINE BESYLATE 10 MG: 10 TABLET ORAL at 08:19

## 2019-11-09 RX ADMIN — FUROSEMIDE 20 MG: 20 TABLET ORAL at 08:20

## 2019-11-09 RX ADMIN — PANTOPRAZOLE SODIUM 40 MG: 40 TABLET, DELAYED RELEASE ORAL at 06:40

## 2019-11-09 RX ADMIN — INSULIN DETEMIR 30 UNITS: 100 INJECTION, SOLUTION SUBCUTANEOUS at 08:18

## 2019-11-09 RX ADMIN — METFORMIN HYDROCHLORIDE 1000 MG: 1000 TABLET ORAL at 08:18

## 2019-11-09 RX ADMIN — LISINOPRIL 40 MG: 40 TABLET ORAL at 08:20

## 2019-11-09 RX ADMIN — GLIPIZIDE 2.5 MG: 5 TABLET ORAL at 08:18

## 2019-11-09 RX ADMIN — METOPROLOL TARTRATE 50 MG: 50 TABLET, FILM COATED ORAL at 08:18

## 2019-11-09 RX ADMIN — OXYCODONE HYDROCHLORIDE AND ACETAMINOPHEN 1 TABLET: 10; 325 TABLET ORAL at 06:38

## 2019-11-09 NOTE — PLAN OF CARE
Problem: Patient Care Overview  Goal: Plan of Care Review  Outcome: Outcome(s) achieved Date Met: 11/09/19 11/09/19 0875   Coping/Psychosocial   Plan of Care Reviewed With patient   Plan of Care Review   Progress improving   OTHER   Outcome Summary VSS; Pt presents at independence level with ADLs, fxl mobility for ADLs. Pt educated re: EC, pacing with daily occupations. No AE/DME needs identified. No further skilled acute OT services indicated at this time. Recommend home with assist at discharge.

## 2019-11-09 NOTE — THERAPY DISCHARGE NOTE
Acute Care - Occupational Therapy Initial Eval/Discharge  [facility]     Patient Name: Delonte Hernandes  : 1960  MRN: [id number]  Today's Date: 2019  Onset of Illness/Injury or Date of Surgery: 19  Date of Referral to OT: 19         Admit Date: 2019       ICD-10-CM ICD-9-CM   1. Synovial cyst M71.30 727.40   2. Facet arthritis of lumbar region M47.816 721.3     Patient Active Problem List   Diagnosis   • Synovial cyst   • Facet arthritis of lumbar region     Past Medical History:   Diagnosis Date   • Arthritis    • Cataract     bilat- still present - mild    • Diabetes mellitus (CMS/HCC)     checks sugar couple times per week    • GERD (gastroesophageal reflux disease)    • Headache     h/o   • Hypertension    • Infectious viral hepatitis     cure   • Sleep apnea     doesnt use machine    • Wears glasses      Past Surgical History:   Procedure Laterality Date   • FACIAL RECONSTRUCTION SURGERY      long time ago   • WISDOM TOOTH EXTRACTION            OT ASSESSMENT FLOWSHEET (last 12 hours)      Occupational Therapy Evaluation     Row Name 19 0755                   OT Evaluation Time/Intention    Subjective Information  complains of;pain  -TA        Document Type  discharge evaluation/summary  -TA        Mode of Treatment  occupational therapy  -TA        Patient Effort  excellent  -TA        Symptoms Noted During/After Treatment  none  -TA           General Information    Patient Profile Reviewed?  yes  -TA        Onset of Illness/Injury or Date of Surgery  19  -TA        Patient Observations  alert;cooperative;agree to therapy  -TA        Patient/Family Observations  Spouse present in room  -TA        General Observations of Patient  Pt in fowlers in bed, RA  -TA        Prior Level of Function  min assist:;gait;transfer;bed mobility;ADL's  -TA        Equipment Currently Used at Home  cane, straight;glucometer;ramp  -TA        Pertinent History of Current Functional Problem   Pt admitted for minimal access decompressive laminectomy and excision of epidural synovial cyst L4-L, Left sided approach.   -TA        Existing Precautions/Restrictions  fall;spinal  -TA        Risks Reviewed  patient:;spouse/S.O.:;LOB;dizziness;increased discomfort;change in vital signs  -TA        Benefits Reviewed  patient:;spouse/S.O.:;improve function;increase independence;increase knowledge  -TA        Barriers to Rehab  none identified  -TA           Relationship/Environment    Primary Source of Support/Comfort  spouse  -TA        Lives With  spouse  -TA           Resource/Environmental Concerns    Current Living Arrangements  home/apartment/condo  -TA        Resource/Environmental Concerns  none  -TA           Cognitive Assessment/Intervention- PT/OT    Orientation Status (Cognition)  oriented x 4  -TA        Follows Commands (Cognition)  WNL  -TA           Safety Issues, Functional Mobility    Impairments Affecting Function (Mobility)  endurance/activity tolerance;pain  -TA           Bed Mobility Assessment/Treatment    Bed Mobility Assessment/Treatment  rolling left;sidelying-sit  -TA        Rolling Left Brown (Bed Mobility)  independent  -TA        Sidelying-Sit Brown (Bed Mobility)  independent  -TA        Comment (Bed Mobility)  Good safety awareness  -TA           Functional Mobility    Functional Mobility- Ind. Level  independent  -TA        Functional Mobility- Device  other (see comments) No AD  -TA        Functional Mobility-Distance (Feet)  -- In room, in hallway  -TA           Transfer Assessment/Treatment    Transfer Assessment/Treatment  sit-stand transfer;stand-sit transfer  -TA        Comment (Transfers)  Good safety awareness  -TA           Sit-Stand Transfer    Sit-Stand Brown (Transfers)  independent  -TA           Stand-Sit Transfer    Stand-Sit Brown (Transfers)  independent  -TA           ADL Assessment/Intervention    BADL Assessment/Intervention  upper  body dressing;lower body dressing  -TA           Upper Body Dressing Assessment/Training    Upper Body Dressing [place] Level  don;doff;front opening garment;independent  -TA        Upper Body Dressing Position  edge of bed sitting;unsupported standing  -TA        Comment (Upper Body Dressing)  No safety concerns  -TA           Lower Body Dressing Assessment/Training    Lower Body Dressing [place] Level  doff;don;socks;independent  -TA        Lower Body Dressing Position  edge of bed sitting  -TA        Comment (Lower Body Dressing)  cross leg technique  -TA           BADL Safety/Performance    Impairments, BADL Safety/Performance  endurance/activity tolerance;pain  -TA        Skilled BADL Treatment/Intervention  energy conservation  -TA           General ROM    GENERAL ROM COMMENTS  BUE WFL  -TA           MMT (Manual Muscle Testing)    General MMT Comments  BUE groosly WFL; stephy  5/5  -TA           Motor Assessment/Interventions    Additional Documentation  Balance (Group);Balance Interventions (Group)  -TA           Balance    Balance  dynamic standing balance;dynamic sitting balance  -TA           Dynamic Sitting Balance    Level of [place], Reaches Outside Midline (Sitting, Dynamic Balance)  independent  -TA        Sitting Position, Reaches Outside Midline (Sitting, Dynamic Balance)  sitting on edge of bed  -TA           Dynamic Standing Balance    Level of [place], Reaches Outside Midline (Standing, Dynamic Balance)  independent  -TA        Time Able to Maintain Position, Reaches Outside Midline (Standing, Dynamic Balance)  more than 5 minutes  -TA           Sensory Assessment/Intervention    Sensory General Assessment  no sensation deficits identified;other (see comments) BUE intact  -TA           Positioning and Restraints    Pre-Treatment Position  in bed  -TA        Post Treatment Position  bed  -TA        In Bed  sitting EOB;call light within reach;encouraged to call for assist;with  family/caregiver  -TA           Pain Assessment    Additional Documentation  Pain Scale: Numbers Pre/Post-Treatment (Group);Pain Scale: FACES Pre/Post-Treatment (Group)  -TA           Pain Scale: Numbers Pre/Post-Treatment    Pain Scale: Numbers, Pretreatment  3/10  -TA        Pain Scale: Numbers, Post-Treatment  3/10  -TA        Pain Location - Side  Bilateral  -TA        Pain Location - Orientation  lower  -TA        Pain Location  back  -TA        Pre/Post Treatment Pain Comment  tolerated, pre-medicated  -TA        Pain Intervention(s)  Repositioned;Ambulation/increased activity  -TA           Pain Scale: FACES Pre/Post-Treatment    Pain: FACES Scale, Pretreatment  2-->hurts little bit  -TA        Pain: FACES Scale, Post-Treatment  2-->hurts little bit  -TA        Pre/Post Treatment Pain Comment  tolerated  -TA           Wound 11/08/19 1115 Bilateral back Incision    Wound - Properties Group Date first assessed: 11/08/19  -KS Time first assessed: 1115  -KS Side: Bilateral  -KS Location: back  -KS Primary Wound Type: Incision  -KS       Coping    Observed Emotional State  accepting;cooperative  -TA        Verbalized Emotional State  acceptance  -TA           Plan of Care Review    Plan of Care Reviewed With  patient;spouse  -TA           Clinical Impression (OT)    Date of Referral to OT  11/08/19  -TA        Functional Level at Time of Evaluation (OT Eval)  Pt presents at independence level for ADLs, fxl mobility for ADLs  -TA        Patient/Family Goals Statement (OT Eval)  Go home  -TA        Criteria for Skilled Therapeutic Interventions Met (OT Eval)  no;no problems identified which require skilled intervention  -TA        Care Plan Review (OT)  evaluation/treatment results reviewed;care plan/treatment goals reviewed;risks/benefits reviewed;patient/other agree to care plan  -TA        Care Plan Review, Other Participant (OT Eval)  spouse  -TA        Anticipated Discharge Disposition (OT)  home with assist   -TA           Vital Signs    Pre Systolic BP Rehab  -- VSS; RN cleared pt for tx  -TA        Pre Patient Position  Supine  -TA        Intra Patient Position  Standing  -TA        Post Patient Position  Sitting  -TA           Patient Education Goal (OT)    Activity (Patient Education Goal, OT)  Pt will verbalize good understanding of EC, pacing with daily occupations  -TA        Tyler/Cues/Accuracy (Memory Goal 2, OT)  verbalizes understanding  -TA        Time Frame (Patient Education Goal, OT)  1 day  -TA        Progress/Outcome (Patient Education Goal, OT)  goal met  -TA           Living Environment    Home Accessibility  other (see comments) ramp available  -TA          User Key  (r) = Recorded By, (t) = Taken By, (c) = Cosigned By    Initials Name Effective Dates    TA Shahriar Arisa OT 03/14/16 -     Abbie Price RN 05/09/17 -           Occupational Therapy Education     Title: PT OT SLP Therapies (Done)     Topic: Occupational Therapy (Done)     Point: ADL training (Done)     Description: Instruct learner(s) on proper safety adaptation and remediation techniques during self care or transfers.   Instruct in proper use of assistive devices.    Learning Progress Summary           Patient Acceptance, E, VU by TA at 11/9/2019  8:32 AM    Comment:  Education reinforced re: spinal precautions, cross leg technique for LBD; reinforced need for call for assist with OOB activities.                   Point: Precautions (Done)     Description: Instruct learner(s) on prescribed precautions during self-care and functional transfers.    Learning Progress Summary           Patient Acceptance, E, VU by TA at 11/9/2019  8:32 AM    Comment:  Education reinforced re: spinal precautions, cross leg technique for LBD; reinforced need for call for assist with OOB activities.                   Point: Body mechanics (Done)     Description: Instruct learner(s) on proper positioning and spine alignment during  self-care, functional mobility activities and/or exercises.    Learning Progress Summary           Patient Acceptance, E, VU by TA at 11/9/2019  8:32 AM    Comment:  Education reinforced re: spinal precautions, cross leg technique for LBD; reinforced need for call for assist with OOB activities.                               User Key     Initials Effective Dates Name Provider Type Discipline    RUSTY 03/14/16 -  Shahriar Arias, OT Occupational Therapist OT                OT Recommendation and Plan  Outcome Summary/Treatment Plan (OT)  Anticipated Discharge Disposition (OT): home with assist  Plan of Care Review  Plan of Care Reviewed With: patient  Plan of Care Reviewed With: patient  Outcome Summary: VSS; Pt presents at independence level with ADLs, fxl mobility for ADLs. Pt educated re: EC, pacing with daily occupations. No AE/DME needs identified. No further skilled acute OT services indicated at this time. Recommend home with assist at discharge.      Rehab Goal Summary     Row Name 11/09/19 0755             Patient Education Goal (OT)    Activity (Patient Education Goal, OT)  Pt will verbalize good understanding of EC, pacing with daily occupations  -TA      [place]/Cues/Accuracy (Memory Goal 2, OT)  verbalizes understanding  -TA      Time Frame (Patient Education Goal, OT)  1 day  -TA      Progress/Outcome (Patient Education Goal, OT)  goal met  -TA        User Key  (r) = Recorded By, (t) = Taken By, (c) = Cosigned By    Initials Name Provider Type Discipline    Shahriar Rosales, OT Occupational Therapist OT          Outcome Measures     Row Name 11/09/19 0755             How much help from another is currently needed...    Putting on and taking off regular lower body clothing?  4  -TA      Bathing (including washing, rinsing, and drying)  4  -TA      Toileting (which includes using toilet bed pan or urinal)  4  -TA      Putting on and taking off regular upper body clothing  4  -TA      Taking  care of personal grooming (such as brushing teeth)  4  -TA      Eating meals  4  -TA      AM-PAC 6 Clicks Score (OT)  24  -TA         Functional Assessment    Outcome Measure Options  AM-PAC 6 Clicks Daily Activity (OT)  -TA        User Key  (r) = Recorded By, (t) = Taken By, (c) = Cosigned By    Initials Name Provider Type    Shahriar Rosales OT Occupational Therapist          Time Calculation:   Time Calculation- OT     Row Name 11/09/19 0837             Time Calculation- OT    OT Start Time  0755 ttc 0 minutes  -TA      OT Received On  11/09/19  -TA        User Key  (r) = Recorded By, (t) = Taken By, (c) = Cosigned By    Initials Name Provider Type    Shahriar Rosales OT Occupational Therapist        Therapy Suggested Charges     Code   Minutes Charges    None           Therapy Charges for Today     Code Description Service Date Service Provider Modifiers Qty    [id number]  OT EVAL MOD COMPLEXITY 4 11/9/2019 Shahriar Arias OT GO 1               OT Discharge Summary  Anticipated Discharge Disposition (OT): home with assist  Reason for Discharge: Independent  Discharge Destination: Home with assist    Shahriar Arias OT  11/9/2019

## 2019-11-09 NOTE — DISCHARGE SUMMARY
DISCHARGE SUMMARY    Date of Admission: 11/8/2019    Date of Discharge:  11/9/2019    Discharge Diagnosis: Lumbar stenosis L4-5 with large left facet synovial cyst    Procedures Performed:  Procedure(s):  LUMBAR LAMINECTOMY  L 4-5 LEFT REMOVAL CYST    Referring Physician: Felipa Lund MD    Presenting Problem/History of Present Illness  Synovial cyst [M71.30]  Facet arthritis of lumbar region [M47.816]  Synovial cyst [M71.30]  Synovial cyst [M71.30]     Hospital Course  Patient is a 59 y.o. male who presented with pain in the back with radiation principally to the left hip and leg for several months with progressive pain without relief despite adequate conservative management.  MRI scan showed stenosis at L4-5 with a large left-sided epidural facet synovial cyst.  On the day of admission the patient had a minimal access lumbar laminectomy at L4-5 with complete excision of the facet synovial cyst.  Postoperatively he was able to ambulate, read resume a regular diet, and void without difficulty.  He was discharged home on the morning after surgery.  Discharge medication Norco 7.5 for pain, #30 for early  postoperative discomfort.  Follow-up will be scheduled in [place] in 4 weeks for routine postop examination.  Wound closure was subcuticular with skin glue.    Discharge Medications     Discharge Medications      New Medications      Instructions Start Date   HYDROcodone-acetaminophen 7.5-325 MG per tablet  Commonly known as:  NORCO   1 tablet, Oral, Every 4 Hours PRN         Continue These Medications      Instructions Start Date   amLODIPine 10 MG tablet  Commonly known as:  NORVASC   10 mg, Oral, Daily      aspirin 81 MG EC tablet   81 mg, Oral, Daily      chlorhexidine 4 % external liquid  Commonly known as:  HIBICLENS   Shower each day with solution for 5 days beginning 5 days before surgery.      ANTISEPTIC SKIN CLEANSER 4 % solution  Generic drug:  Chlorhexidine Gluconate   Topical      furosemide 20 MG  tablet  Commonly known as:  LASIX   20 mg, Oral, Daily      glimepiride 1 MG tablet  Commonly known as:  AMARYL   1 mg, Oral, Every Morning Before Breakfast      insulin glargine 100 UNIT/ML injection  Commonly known as:  LANTUS   35 Units, Subcutaneous, Daily      lisinopril 40 MG tablet  Commonly known as:  PRINIVIL,ZESTRIL   40 mg, Oral, 2 Times Daily      metFORMIN 1000 MG tablet  Commonly known as:  GLUCOPHAGE   1,000 mg, Oral, 2 Times Daily With Meals      metoprolol tartrate 50 MG tablet  Commonly known as:  LOPRESSOR   50 mg, Oral, 2 Times Daily      mupirocin 2 % ointment  Commonly known as:  BACTROBAN   Apply to the inside of each nostril with a cotton swab twice daily, morning and evening, for 5 days before surgery      omeprazole 20 MG capsule  Commonly known as:  priLOSEC   20 mg, Oral, Daily      oxyCODONE-acetaminophen 7.5-325 MG per tablet  Commonly known as:  PERCOCET   1 tablet, Oral, Every 6 Hours PRN             Nav Douglass MD  11/09/19  6:22 AM

## 2019-11-09 NOTE — THERAPY EVALUATION
Patient Name: Delonte Hernandes  : 1960    MRN: [id number]                              Today's Date: 2019       Admit Date: 2019    Visit Dx:     ICD-10-CM ICD-9-CM   1. Synovial cyst M71.30 727.40   2. Facet arthritis of lumbar region M47.816 721.3     Patient Active Problem List   Diagnosis   • Synovial cyst   • Facet arthritis of lumbar region     Past Medical History:   Diagnosis Date   • Arthritis    • Cataract     bilat- still present - mild    • Diabetes mellitus (CMS/HCC)     checks sugar couple times per week    • GERD (gastroesophageal reflux disease)    • Headache     h/o   • Hypertension    • Infectious viral hepatitis     cure   • Sleep apnea     doesnt use machine    • Wears glasses      Past Surgical History:   Procedure Laterality Date   • FACIAL RECONSTRUCTION SURGERY      long time ago   • WISDOM TOOTH EXTRACTION       General Information     Row Name 19          PT Evaluation Time/Intention    Document Type  discharge evaluation/summary  -     Mode of Treatment  individual therapy;physical therapy  -     Row Name 19          General Information    Patient Profile Reviewed?  yes  -     Prior Level of Function  -- Pt reports doing everything for self but taking extra time and being painful.  -     Existing Precautions/Restrictions  spinal  -     Barriers to Rehab  none identified  -     Row Name 19          Relationship/Environment    Lives With  spouse  -     Row Name 19          Resource/Environmental Concerns    Current Living Arrangements  home/apartment/condo  -     Row Name 19          Home Main Entrance    Number of Stairs, Main Entrance  none  -     Stair Railings, Main Entrance  none  -     Row Name 19          Stairs Within Home, Primary    Number of Stairs, Within Home, Primary  none  -     Stairs Comment, Within Home, Primary  Pt has a ramp to enter home.  -     Row Name 19           Cognitive Assessment/Intervention- PT/OT    Orientation Status (Cognition)  oriented x 4  -     Row Name 11/09/19 0830          Safety Issues, Functional Mobility    Impairments Affecting Function (Mobility)  endurance/activity tolerance;pain  -       User Key  (r) = Recorded By, (t) = Taken By, (c) = Cosigned By    Initials Name Provider Type    Della Coughlin, PT Physical Therapist        Mobility     Row Name 11/09/19 0830          Bed Mobility Assessment/Treatment    Bed Mobility Assessment/Treatment  supine-sit;sit-supine  -     Supine-Sit [place] (Bed Mobility)  supervision;verbal cues  -     Sit-Supine [place] (Bed Mobility)  supervision;verbal cues  -     Comment (Bed Mobility)  Pt edu in safe log roll with bed flat and no rails to simulate home. Pt performed fluently and reports performing prior to surgery.  -     Row Name 11/09/19 0830          Bed-Chair Transfer    Bed-Chair [place] (Transfers)  supervision  -     Assistive Device (Bed-Chair Transfers)  -- none  -     Row Name 11/09/19 0830          Sit-Stand Transfer    Sit-Stand [place] (Transfers)  supervision  -     Assistive Device (Sit-Stand Transfers)  -- none  -     Row Name 11/09/19 0830          Gait/Stairs Assessment/Training    Gait/Stairs Assessment/Training  gait/ambulation independence;gait/ambulation assistive device;distance ambulated;gait pattern  -     [place] Level (Gait)  supervision  -     Assistive Device (Gait)  -- none  -     Distance in Feet (Gait)  284  -     Deviations/Abnormal Patterns (Gait)  antalgic  -     Bilateral Gait Deviations  heel strike decreased  -     Comment (Gait/Stairs)  Pt ambulated with antalgic gait due to pain but no LOB.  -       User Key  (r) = Recorded By, (t) = Taken By, (c) = Cosigned By    Initials Name Provider Type    Della Coughlin PT Physical Therapist        Obj/Interventions     Row Name 11/09/19 0830           General ROM    GENERAL ROM COMMENTS  B LE AROM WNL  -[facility] Name 11/09/19 0830          MMT (Manual Muscle Testing)    General MMT Comments  B LE gross assessment 2+/5  -[facility] Name 11/09/19 0830          Therapeutic Exercise    Sets/Reps (Therapeutic Exercise)  Pt given written HEP, demonstrated and reviewed HEP with pt.   -[facility] Name 11/09/19 0830          Static Sitting Balance    Level of [place] (Unsupported Sitting, Static Balance)  independent  -     Sitting Position (Unsupported Sitting, Static Balance)  sitting on edge of bed  -     Time Able to Maintain Position (Unsupported Sitting, Static Balance)  more than 5 minutes  -       User Key  (r) = Recorded By, (t) = Taken By, (c) = Cosigned By    Initials Name Provider Type    Della Coughlin, PT Physical Therapist        Goals/Plan    No documentation.       Clinical Impression     [facility] Name 11/09/19 0830          Pain Assessment    Additional Documentation  Pain Scale: Numbers Pre/Post-Treatment (Group)  -SH     Row Name 11/09/19 0830          Pain Scale: Numbers Pre/Post-Treatment    Pain Scale: Numbers, Pretreatment  8/10  -     Pain Scale: Numbers, Post-Treatment  8/10  -     Pain Location  back  -     Pain Intervention(s)  Repositioned;Ambulation/increased activity  -[facility] Name 11/09/19 0830          Pain Scale: FACES Pre/Post-Treatment    Pain: FACES Scale, Pretreatment  2-->hurts little bit  -     Pain: FACES Scale, Post-Treatment  2-->hurts little bit  -[facility] Name 11/09/19 0830          Plan of Care Review    Plan of Care Reviewed With  patient;spouse  -SH     Row Name 11/09/19 0830          Physical Therapy Clinical Impression    Patient/Family Goals Statement (PT Clinical Impression)  Go home.  -[facility] Name 11/09/19 0830          Vital Signs    Pre Patient Position  Sitting  -     Intra Patient Position  Standing  -     Post Patient Position  Sitting  -[facility] Name 11/09/19 0830           Positioning and Restraints    Pre-Treatment Position  in bed  -SH     Post Treatment Position  bed  -SH     In Bed  sitting EOB;with family/caregiver;call light within reach;encouraged to call for assist  -       User Key  (r) = Recorded By, (t) = Taken By, (c) = Cosigned By    Initials Name Provider Type    Della Coughlin, PT Physical Therapist        Outcome Measures     Row Name 11/09/19 0830          How much help from another person do you currently need...    Turning from your back to your side while in flat bed without using bedrails?  4  -SH     Moving from lying on back to sitting on the side of a flat bed without bedrails?  4  -SH     Moving to and from a bed to a chair (including a wheelchair)?  4  -SH     Standing up from a chair using your arms (e.g., wheelchair, bedside chair)?  4  -SH     Climbing 3-5 steps with a railing?  3  -SH     To walk in hospital room?  4  -SH     AM-PAC 6 Clicks Score (PT)  23  -SH     Row Name 11/09/19 0830          Functional Assessment    Outcome Measure Options  AM-PAC 6 Clicks Basic Mobility (PT)  -       User Key  (r) = Recorded By, (t) = Taken By, (c) = Cosigned By    Initials Name Provider Type    Della Coughlin PT Physical Therapist        Physical Therapy Education     Title: PT OT SLP Therapies (Done)     Topic: Physical Therapy (Done)     Point: Mobility training (Done)     Learning Progress Summary           Patient Acceptance, E,D,H, VU,DU by  at 11/9/2019  8:30 AM    Comment:  Log roll, spinal precautions, HEP   Family Acceptance, E,D,H, VU,DU by  at 11/9/2019  8:30 AM    Comment:  Log roll, spinal precautions, HEP                   Point: Home exercise program (Done)     Learning Progress Summary           Patient Acceptance, E,D,H, VU,DU by  at 11/9/2019  8:30 AM    Comment:  Log roll, spinal precautions, HEP   Family Acceptance, E,D,H, VU,DU by  at 11/9/2019  8:30 AM    Comment:  Log roll, spinal precautions, HEP                    Point: Body mechanics (Done)     Learning Progress Summary           Patient Acceptance, E,D,H, VU,DU by  at 11/9/2019  8:30 AM    Comment:  Log roll, spinal precautions, HEP   Family Acceptance, E,D,H, VU,DU by  at 11/9/2019  8:30 AM    Comment:  Log roll, spinal precautions, HEP                   Point: Precautions (Done)     Learning Progress Summary           Patient Acceptance, E,D,H, VU,DU by  at 11/9/2019  8:30 AM    Comment:  Log roll, spinal precautions, HEP   Family Acceptance, E,D,H, VU,DU by  at 11/9/2019  8:30 AM    Comment:  Log roll, spinal precautions, HEP                               User Key     Initials Effective Dates Name Provider Type [facility] 06/19/15 -  Della Lipscomb, PT Physical Therapist PT              PT Recommendation and Plan     Plan of Care Reviewed With: patient, spouse     Time Calculation:   PT Charges     Row Name 11/09/19 0830             Time Calculation    Start Time  0830  -      PT Received On  11/09/19  -        User Key  (r) = Recorded By, (t) = Taken By, (c) = Cosigned By    Initials Name Provider Type     Della Lipscomb, PT Physical Therapist        Therapy Charges for Today     Code Description Service Date Service Provider Modifiers Qty    40384291734 HC PT EVAL LOW COMPLEXITY 4 11/9/2019 Della Lipscomb, PT GP 1          PT G-Codes  Outcome Measure Options: AM-PAC 6 Clicks Basic Mobility (PT)  AM-PAC 6 Clicks Score (PT): 23  AM-PAC 6 Clicks Score (OT): 24    Della Lipscomb PT  11/9/2019

## 2019-11-11 LAB
CYTO UR: NORMAL
LAB AP CASE REPORT: NORMAL
LAB AP CLINICAL INFORMATION: NORMAL
PATH REPORT.FINAL DX SPEC: NORMAL
PATH REPORT.GROSS SPEC: NORMAL

## 2019-12-31 ENCOUNTER — OFFICE VISIT (OUTPATIENT)
Dept: NEUROSURGERY | Facility: CLINIC | Age: 59
End: 2019-12-31

## 2019-12-31 DIAGNOSIS — Z98.890 STATUS POST LUMBAR LAMINECTOMY: Primary | ICD-10-CM

## 2019-12-31 PROCEDURE — 99024 POSTOP FOLLOW-UP VISIT: CPT | Performed by: NEUROLOGICAL SURGERY

## 2019-12-31 NOTE — PROGRESS NOTES
Andreea Hernandes is a 59 y.o. male who presents for follow up of L4-5 laminectomy and excision of left facet epidural synovial cyst on 11/8/2019.     The patient had a back pain radiating to hips and legs with findings on MRI scan of significant stenosis at L4-5 with a large left-sided epidural facet synovial cyst.  Minimal access decompression of the stenosis with removal of the synovial cyst was accomplished 7 weeks ago on 11/8/2019.  He felt great for 2 weeks and then pain returned in his back and hips bilaterally for a couple weeks and then it has finally begun to subside and has nearly cleared up.    The following portions of the patient's history were reviewed, updated as appropriate and approved: allergies, current medications, past family history, past medical history, past social history, past surgical history, review of systems and problem list.     Review of Systems   Constitutional: Negative for activity change, appetite change, chills, diaphoresis, fatigue, fever and unexpected weight change.   HENT: Negative for congestion, dental problem, drooling, ear discharge, ear pain, facial swelling, hearing loss, mouth sores, nosebleeds, postnasal drip, rhinorrhea, sinus pressure, sneezing, sore throat, tinnitus, trouble swallowing and voice change.    Eyes: Negative for photophobia, pain, discharge, redness, itching and visual disturbance.   Respiratory: Negative for apnea, cough, choking, chest tightness, shortness of breath, wheezing and stridor.    Cardiovascular: Negative for chest pain, palpitations and leg swelling.   Gastrointestinal: Negative for abdominal distention, abdominal pain, anal bleeding, blood in stool, constipation, diarrhea, nausea, rectal pain and vomiting.   Endocrine: Negative for cold intolerance, heat intolerance, polydipsia, polyphagia and polyuria.   Genitourinary: Negative for decreased urine volume, difficulty urinating, dysuria, enuresis, flank pain, frequency, genital  sores, hematuria and urgency.   Musculoskeletal: Positive for arthralgias, back pain, gait problem, joint swelling, myalgias, neck pain and neck stiffness.   Skin: Negative for color change, pallor, rash and wound.   Allergic/Immunologic: Negative for environmental allergies, food allergies and immunocompromised state.   Neurological: Positive for dizziness and weakness. Negative for tremors, seizures, syncope, facial asymmetry, speech difficulty, light-headedness, numbness and headaches.   Hematological: Negative for adenopathy. Does not bruise/bleed easily.   Psychiatric/Behavioral: Negative for agitation, behavioral problems, confusion, decreased concentration, dysphoric mood, hallucinations, self-injury, sleep disturbance and suicidal ideas. The patient is not nervous/anxious and is not hyperactive.        Objective    NEUROLOGICAL EXAMINATION:    Lumbar incision well-healed    Assessment   7 weeks postop lumbar laminectomy L4-5 with excision of facet synovial cyst left side, the initial recurrence of pain after surgery was due to the epidural steroid effect wearing off from the surgery, he now is over 6 weeks postop and doing very well and should have a very good result.       Plan   No additional follow-up necessary.       Nav Douglass MD

## 2025-01-08 ENCOUNTER — HOSPITAL ENCOUNTER (OUTPATIENT)
Dept: GENERAL RADIOLOGY | Facility: HOSPITAL | Age: 65
Discharge: HOME | End: 2025-01-08
Admitting: NEUROLOGICAL SURGERY
Payer: MEDICARE

## 2025-01-08 ENCOUNTER — OFFICE VISIT (OUTPATIENT)
Dept: NEUROSURGERY | Facility: CLINIC | Age: 65
End: 2025-01-08
Payer: MEDICARE

## 2025-01-08 VITALS — TEMPERATURE: 97.7 F | HEIGHT: 68 IN | BODY MASS INDEX: 30.62 KG/M2 | RESPIRATION RATE: 17 BRPM | WEIGHT: 202 LBS

## 2025-01-08 DIAGNOSIS — M48.062 SPINAL STENOSIS OF LUMBAR REGION WITH NEUROGENIC CLAUDICATION: Primary | ICD-10-CM

## 2025-01-08 DIAGNOSIS — M43.16 SPONDYLOLISTHESIS OF LUMBAR REGION: ICD-10-CM

## 2025-01-08 DIAGNOSIS — M51.9 LUMBAR DISC DISEASE: ICD-10-CM

## 2025-01-08 PROCEDURE — 72120 X-RAY BEND ONLY L-S SPINE: CPT

## 2025-01-08 PROCEDURE — 99203 OFFICE O/P NEW LOW 30 MIN: CPT | Performed by: NEUROLOGICAL SURGERY

## 2025-01-08 RX ORDER — AMOXICILLIN 500 MG/1
1 CAPSULE ORAL 3 TIMES DAILY
COMMUNITY
Start: 2025-01-03

## 2025-01-08 RX ORDER — SERTRALINE HYDROCHLORIDE 100 MG/1
2 TABLET, FILM COATED ORAL DAILY
COMMUNITY
Start: 2025-01-03

## 2025-01-08 RX ORDER — PRAVASTATIN SODIUM 20 MG
1 TABLET ORAL DAILY
COMMUNITY
Start: 2025-01-03

## 2025-01-08 NOTE — PROGRESS NOTES
Patient: Delonte Hernandes  : 1960    Primary Care Provider: Felipa Pereira MD    Requesting Provider: As above        History    Chief Complaint: Low back and bilateral lower extremity pain with walking and standing intolerance.    History of Present Illness: Mr. Hernandes is a 64-year-old retired  who is known to our service.  On 2019 the patient underwent left L4-5 laminotomy and cyst removal by my former colleague Dr. Douglass.  He did quite well.  Over last several months he has developed increasing back pain that extends into both legs.  He has fallen frequently.  His legs will give out on him.  He reports no numbness.  He has no bowel or bladder dysfunction.  At this point there is no position that really makes him feel better.    Review of Systems   Constitutional:  Negative for activity change, appetite change, chills, diaphoresis, fatigue, fever and unexpected weight change.   HENT:  Negative for congestion, dental problem, drooling, ear discharge, ear pain, facial swelling, hearing loss, mouth sores, nosebleeds, postnasal drip, rhinorrhea, sinus pressure, sneezing, sore throat, tinnitus, trouble swallowing and voice change.    Eyes:  Negative for photophobia, pain, discharge, redness, itching and visual disturbance.   Respiratory:  Negative for apnea, cough, choking, chest tightness, shortness of breath, wheezing and stridor.    Cardiovascular:  Negative for chest pain, palpitations and leg swelling.   Gastrointestinal:  Negative for abdominal distention, abdominal pain, anal bleeding, blood in stool, constipation, diarrhea, nausea, rectal pain and vomiting.   Endocrine: Negative for cold intolerance, heat intolerance, polydipsia, polyphagia and polyuria.   Genitourinary:  Negative for decreased urine volume, difficulty urinating, dysuria, enuresis, flank pain, frequency, genital sores, hematuria and urgency.   Musculoskeletal:  Positive for back pain. Negative for arthralgias, gait  "problem, joint swelling, myalgias, neck pain and neck stiffness.   Skin:  Negative for color change, pallor, rash and wound.   Allergic/Immunologic: Negative for environmental allergies, food allergies and immunocompromised state.   Neurological:  Negative for dizziness, tremors, seizures, syncope, facial asymmetry, speech difficulty, weakness, light-headedness, numbness and headaches.   Hematological:  Negative for adenopathy. Does not bruise/bleed easily.   Psychiatric/Behavioral:  Negative for agitation, behavioral problems, confusion, decreased concentration, dysphoric mood, hallucinations, self-injury, sleep disturbance and suicidal ideas. The patient is not nervous/anxious and is not hyperactive.    All other systems reviewed and are negative.      The patient's past medical history, past surgical history, family history, and social history have been reviewed at length in the electronic medical record.      Physical Exam:   Temp 97.7 °F (36.5 °C) (Infrared)   Resp 17   Ht 172.7 cm (68\")   Wt 91.6 kg (202 lb)   BMI 30.71 kg/m²   CONSTITUTIONAL: Patient is well-nourished, pleasant and appears stated age.  MUSCULOSKELETAL:  Straight leg raising is negative.  Davis's Sign is negative.  ROM in the low back is somewhat limited in all directions.  Tenderness in the back to palpation is not observed.  NEUROLOGICAL:  Orientation, memory, attention span, language function, and cognition have been examined and are intact.  Strength is intact in the lower extremities to direct testing.  Muscle tone is normal throughout.  Station and gait are normal.  Sensation is intact to light touch testing throughout.  Deep tendon reflexes are focal to elicit throughout.  Coordination is intact.      Medical Decision Making    Data Review:   (All imaging studies were personally reviewed unless stated otherwise)  Unfortunately he does not have his MRI imaging.  The study is dated 11/25/2024.  The report suggests degenerative " changes.  There is also said to be a large cyst within the spinal canal arising from the left facet joint.  This apparently causes severe compromise of the spinal canal.    Diagnosis:   1.  Lumbar stenosis with neurogenic claudication.  2.  L4-5 synovial cyst.    Treatment Options:   I have referred the patient for flexion-extension upright lateral lumbar spine x-rays to assess for instability.  He is going to bring his MRI images for review thereafter.  He will likely need surgical intervention.    Scribed for Ellis Espinoza MD by Carol Resendiz CMA on 1/8/2025 11:52 EST         I, Dr. Espinoza, personally performed the services described in the documentation, as scribed in my presence, and it is both accurate and complete.

## 2025-01-22 ENCOUNTER — OFFICE VISIT (OUTPATIENT)
Dept: NEUROSURGERY | Facility: CLINIC | Age: 65
End: 2025-01-22
Payer: MEDICARE

## 2025-01-22 VITALS — WEIGHT: 202.9 LBS | TEMPERATURE: 98.4 F | BODY MASS INDEX: 30.75 KG/M2 | HEIGHT: 68 IN

## 2025-01-22 DIAGNOSIS — M48.062 LUMBAR STENOSIS WITH NEUROGENIC CLAUDICATION: Primary | ICD-10-CM

## 2025-01-22 PROCEDURE — 99213 OFFICE O/P EST LOW 20 MIN: CPT | Performed by: NEUROLOGICAL SURGERY

## 2025-01-22 RX ORDER — FLUTICASONE PROPIONATE 50 MCG
2 SPRAY, SUSPENSION (ML) NASAL DAILY
COMMUNITY
Start: 2024-10-29

## 2025-01-22 RX ORDER — FEXOFENADINE HCL 180 MG/1
1 TABLET ORAL DAILY
COMMUNITY
Start: 2025-01-20

## 2025-01-22 NOTE — PROGRESS NOTES
Patient: Delonte Hernandes  : 1960    Primary Care Provider: Felipa Pereira MD    Requesting Provider: As above        History    Chief Complaint: Low back and bilateral lower extremity pain with walking and standing intolerance.    History of Present Illness: Mr. Hernandes is a 64-year-old retired  who is known to our service. On 2019 the patient underwent left L4-5 laminotomy and cyst removal by my former colleague Dr. Douglass. He did quite well. Over last several months he has developed increasing back pain that extends into both legs. He has fallen frequently. His legs will give out on him. He reports no numbness. He has no bowel or bladder dysfunction. At this point there is no position that really makes him feel better.  He reports no change in his symptoms.    Review of Systems   Constitutional:  Negative for activity change, appetite change, chills, diaphoresis, fatigue, fever and unexpected weight change.   HENT:  Negative for congestion, dental problem, drooling, ear discharge, ear pain, facial swelling, hearing loss, mouth sores, nosebleeds, postnasal drip, rhinorrhea, sinus pressure, sinus pain, sneezing, sore throat, tinnitus, trouble swallowing and voice change.    Eyes:  Negative for photophobia, pain, discharge, redness, itching and visual disturbance.   Respiratory:  Negative for apnea, cough, choking, chest tightness, shortness of breath, wheezing and stridor.    Cardiovascular:  Negative for chest pain, palpitations and leg swelling.   Gastrointestinal:  Negative for abdominal distention, abdominal pain, anal bleeding, blood in stool, constipation, diarrhea, nausea, rectal pain and vomiting.   Endocrine: Negative for cold intolerance, heat intolerance, polydipsia, polyphagia and polyuria.   Genitourinary:  Negative for decreased urine volume, difficulty urinating, dysuria, enuresis, flank pain, frequency, genital sores, hematuria, penile discharge, penile pain, penile  "swelling, scrotal swelling, testicular pain and urgency.   Musculoskeletal:  Positive for arthralgias, back pain and gait problem. Negative for joint swelling, myalgias, neck pain and neck stiffness.   Skin:  Negative for color change, pallor, rash and wound.   Allergic/Immunologic: Negative for environmental allergies, food allergies and immunocompromised state.   Neurological:  Negative for dizziness, tremors, seizures, syncope, facial asymmetry, speech difficulty, weakness, light-headedness, numbness and headaches.   Hematological:  Negative for adenopathy. Does not bruise/bleed easily.   Psychiatric/Behavioral:  Negative for agitation, behavioral problems, confusion, decreased concentration, dysphoric mood, hallucinations, self-injury, sleep disturbance and suicidal ideas. The patient is not nervous/anxious and is not hyperactive.    All other systems reviewed and are negative.    The patient's past medical history, past surgical history, family history, and social history have been reviewed at length in the electronic medical record.      Physical Exam:   Temp 98.4 °F (36.9 °C) (Temporal)   Ht 172.7 cm (68\")   Wt 92 kg (202 lb 14.4 oz)   BMI 30.85 kg/m²   Deferred    Medical Decision Making    Data Review:   (All imaging studies were personally reviewed unless stated otherwise)  Plain flexion-extension films demonstrate a couple millimeters of movement from flexion to extension where there is a grade 1 listhesis.    MRI study dated 11/25/2024 demonstrates normal alignment.  I do not see a listhesis.  There is some diffuse degenerative disc disease.  The study is quite subpar.  The axial images are of little utility.  I believe there is a joint effusion on the right at L4-5.  There is some degree of cyst but is difficult to tell where it is situated in the spinal canal given the poor imaging.    Diagnosis:   Probable lumbar stenosis with instability.    Treatment Options:   Given the poor imaging I am going " to set up a lumbar CT myelogram with upright imaging.  Further recommendations will ensue.      Scribed for Ellis Espinoza MD by Daya Kelley CMA. 1/22/2025 11:54 EST    I, Dr. Espinoza, personally performed the services described in the documentation, as scribed in my presence, and it is both accurate and complete.

## 2025-01-27 ENCOUNTER — TELEPHONE (OUTPATIENT)
Dept: INFUSION THERAPY | Facility: HOSPITAL | Age: 65
End: 2025-01-27
Payer: MEDICARE

## 2025-01-27 RX ORDER — BUPROPION HYDROCHLORIDE 150 MG/1
300 TABLET ORAL DAILY
COMMUNITY

## 2025-01-27 NOTE — TELEPHONE ENCOUNTER
Pre-procedure call- verified appointment time, reviewed, allergies, history, medications and plan of care for day of procedure..

## 2025-01-28 ENCOUNTER — HOSPITAL ENCOUNTER (OUTPATIENT)
Dept: CT IMAGING | Facility: HOSPITAL | Age: 65
Discharge: HOME | End: 2025-01-28
Payer: MEDICARE

## 2025-01-28 ENCOUNTER — HOSPITAL ENCOUNTER (OUTPATIENT)
Dept: GENERAL RADIOLOGY | Facility: HOSPITAL | Age: 65
Discharge: HOME | End: 2025-01-28
Payer: MEDICARE

## 2025-01-28 VITALS
HEIGHT: 68 IN | BODY MASS INDEX: 29.55 KG/M2 | RESPIRATION RATE: 20 BRPM | TEMPERATURE: 97.6 F | OXYGEN SATURATION: 97 % | DIASTOLIC BLOOD PRESSURE: 89 MMHG | SYSTOLIC BLOOD PRESSURE: 157 MMHG | HEART RATE: 80 BPM | WEIGHT: 195 LBS

## 2025-01-28 DIAGNOSIS — M48.062 LUMBAR STENOSIS WITH NEUROGENIC CLAUDICATION: ICD-10-CM

## 2025-01-28 LAB — GLUCOSE BLDC GLUCOMTR-MCNC: 201 MG/DL (ref 70–130)

## 2025-01-28 PROCEDURE — 25010000002 LIDOCAINE PF 1% 1 % SOLUTION: Performed by: NEUROLOGICAL SURGERY

## 2025-01-28 PROCEDURE — 72240 MYELOGRAPHY NECK SPINE: CPT

## 2025-01-28 PROCEDURE — 82948 REAGENT STRIP/BLOOD GLUCOSE: CPT

## 2025-01-28 PROCEDURE — 72120 X-RAY BEND ONLY L-S SPINE: CPT

## 2025-01-28 PROCEDURE — 25510000001 IOPAMIDOL 41 % SOLUTION: Performed by: NEUROLOGICAL SURGERY

## 2025-01-28 PROCEDURE — 72132 CT LUMBAR SPINE W/DYE: CPT

## 2025-01-28 PROCEDURE — 62304 MYELOGRAPHY LUMBAR INJECTION: CPT

## 2025-01-28 RX ORDER — PROMETHAZINE HYDROCHLORIDE 25 MG/1
25 TABLET ORAL ONCE AS NEEDED
Status: DISCONTINUED | OUTPATIENT
Start: 2025-01-28 | End: 2025-01-29 | Stop reason: HOSPADM

## 2025-01-28 RX ORDER — ONDANSETRON 4 MG/1
4 TABLET, ORALLY DISINTEGRATING ORAL ONCE AS NEEDED
Status: DISCONTINUED | OUTPATIENT
Start: 2025-01-28 | End: 2025-01-29 | Stop reason: HOSPADM

## 2025-01-28 RX ORDER — IOPAMIDOL 408 MG/ML
20 INJECTION, SOLUTION INTRATHECAL
Status: COMPLETED | OUTPATIENT
Start: 2025-01-28 | End: 2025-01-28

## 2025-01-28 RX ORDER — LIDOCAINE HYDROCHLORIDE 10 MG/ML
5 INJECTION, SOLUTION EPIDURAL; INFILTRATION; INTRACAUDAL; PERINEURAL ONCE
Status: COMPLETED | OUTPATIENT
Start: 2025-01-28 | End: 2025-01-28

## 2025-01-28 RX ADMIN — IOPAMIDOL 20 ML: 408 INJECTION, SOLUTION INTRATHECAL at 07:05

## 2025-01-28 RX ADMIN — LIDOCAINE HYDROCHLORIDE 5 ML: 10 INJECTION, SOLUTION EPIDURAL; INFILTRATION; INTRACAUDAL; PERINEURAL at 07:01

## 2025-01-28 NOTE — POST-PROCEDURE NOTE
MYELOGRAM PROCEDURE NOTE  Neurosurgery    Patient: Delonte Hernandes  : 1960      PreOp Diagnosis: Lumbar stenosis with neurogenic claudication    PostOp Diagnosis: Same    Procedure: Lumbar myelogram    Surgeon: Alexis    Anesthesia: 1% lidocaine    Technique:   Spinal needle: 22 gauge   Contrast: Isovue 200 (20ml)   Injection site: L3    EBL: Trace    Specimens removed: None    Complication: None        Ellsi Espinoza MD  25  07:08 EST

## 2025-01-28 NOTE — DISCHARGE INSTRUCTIONS
You may restart your metformin on Thursday morning     You will need to rest in a reclined position the remainder of the day today. Avoid any strenuous activities, pulling, tugging, or straining for 48 hours.     You will need to drink plenty of fluids for the next 48 hours to avoid the risk of a spinal headache and to flush the contrast dye out of your system. Include caffeinated beverages especially if you are experiencing a headache.    You may shower tomorrow; but you will need to avoid tub baths or any situation where your are submersed in water for the next 4 or 5 days to avoid the risk of infection.     You will need to take the cd you were given today with you to your follow up appointment.    DO NOT DRIVE ON THE DAY OF YOUR PROCEDURE.    If you have any problems or concern please contact your physician's office.

## 2025-01-29 ENCOUNTER — TELEPHONE (OUTPATIENT)
Dept: INFUSION THERAPY | Facility: HOSPITAL | Age: 65
End: 2025-01-29
Payer: MEDICARE

## 2025-01-31 ENCOUNTER — PREP FOR SURGERY (OUTPATIENT)
Dept: OTHER | Facility: HOSPITAL | Age: 65
End: 2025-01-31
Payer: MEDICARE

## 2025-01-31 ENCOUNTER — OFFICE VISIT (OUTPATIENT)
Dept: NEUROSURGERY | Facility: CLINIC | Age: 65
End: 2025-01-31
Payer: MEDICARE

## 2025-01-31 VITALS — HEIGHT: 68 IN | TEMPERATURE: 95.8 F | BODY MASS INDEX: 30.28 KG/M2 | WEIGHT: 199.8 LBS

## 2025-01-31 DIAGNOSIS — M43.16 SPONDYLOLISTHESIS OF LUMBAR REGION: Primary | ICD-10-CM

## 2025-01-31 DIAGNOSIS — M48.062 LUMBAR STENOSIS WITH NEUROGENIC CLAUDICATION: Primary | ICD-10-CM

## 2025-01-31 DIAGNOSIS — M48.062 LUMBAR STENOSIS WITH NEUROGENIC CLAUDICATION: ICD-10-CM

## 2025-01-31 PROCEDURE — 99214 OFFICE O/P EST MOD 30 MIN: CPT | Performed by: NEUROLOGICAL SURGERY

## 2025-01-31 RX ORDER — ACETAMINOPHEN 500 MG
1000 TABLET ORAL ONCE
OUTPATIENT
Start: 2025-01-31 | End: 2025-01-31

## 2025-01-31 RX ORDER — CHLORHEXIDINE GLUCONATE 40 MG/ML
SOLUTION TOPICAL
Qty: 120 ML | Refills: 0 | Status: SHIPPED | OUTPATIENT
Start: 2025-01-31

## 2025-01-31 RX ORDER — OXYCODONE HCL 10 MG/1
10 TABLET, FILM COATED, EXTENDED RELEASE ORAL ONCE
OUTPATIENT
Start: 2025-01-31 | End: 2025-01-31

## 2025-01-31 RX ORDER — FAMOTIDINE 20 MG/1
20 TABLET, FILM COATED ORAL
OUTPATIENT
Start: 2025-01-31

## 2025-01-31 RX ORDER — IBUPROFEN 800 MG/1
800 TABLET, FILM COATED ORAL ONCE
OUTPATIENT
Start: 2025-01-31 | End: 2025-01-31

## 2025-01-31 NOTE — H&P
Patient: Delonte Hernandes  : 1960     Primary Care Provider: Felipa Pereira MD     Requesting Provider: As above           History     Chief Complaint: Low back and bilateral lower extremity pain with walking and standing intolerance.     History of Present Illness: Mr. Hernandes is a 64-year-old retired  who is known to our service. On 2019 the patient underwent left L4-5 laminotomy and cyst removal by my former colleague Dr. Douglass. He did quite well. Over last several months he has developed increasing back pain that extends into both legs. He has fallen frequently. His legs will give out on him. He reports no numbness. He has no bowel or bladder dysfunction. At this point there is no position that really makes him feel better.  He reports no change in his symptoms.      Review of Systems   Constitutional:  Negative for activity change, appetite change, chills, diaphoresis, fatigue, fever and unexpected weight change.   HENT:  Negative for congestion, dental problem, drooling, ear discharge, ear pain, facial swelling, hearing loss, mouth sores, nosebleeds, postnasal drip, rhinorrhea, sinus pressure, sinus pain, sneezing, sore throat, tinnitus, trouble swallowing and voice change.    Eyes:  Negative for photophobia, pain, discharge, redness, itching and visual disturbance.   Respiratory:  Negative for apnea, cough, choking, chest tightness, shortness of breath, wheezing and stridor.    Cardiovascular:  Negative for chest pain, palpitations and leg swelling.   Gastrointestinal:  Negative for abdominal distention, abdominal pain, anal bleeding, blood in stool, constipation, diarrhea, nausea, rectal pain and vomiting.   Endocrine: Negative for cold intolerance, heat intolerance, polydipsia, polyphagia and polyuria.   Genitourinary:  Negative for decreased urine volume, difficulty urinating, dysuria, enuresis, flank pain, frequency, genital sores, hematuria, penile discharge, penile pain,  penile swelling, scrotal swelling, testicular pain and urgency.   Musculoskeletal:  Positive for arthralgias, back pain and gait problem. Negative for joint swelling, myalgias, neck pain and neck stiffness.   Skin:  Negative for color change, pallor, rash and wound.   Allergic/Immunologic: Negative for environmental allergies, food allergies and immunocompromised state.   Neurological:  Negative for dizziness, tremors, seizures, syncope, facial asymmetry, speech difficulty, weakness, light-headedness, numbness and headaches.   Hematological:  Negative for adenopathy. Does not bruise/bleed easily.   Psychiatric/Behavioral:  Negative for agitation, behavioral problems, confusion, decreased concentration, dysphoric mood, hallucinations, self-injury, sleep disturbance and suicidal ideas. The patient is not nervous/anxious and is not hyperactive.    All other systems reviewed and are negative.     The patient's past medical history, past surgical history, family history, and social history have been reviewed at length in the electronic medical record.     Past Medical History:   Diagnosis Date    Arthritis     Brain concussion     Cataract     bilat- still present - mild     Claustrophobia     Diabetes mellitus     checks sugar couple times per week     GERD (gastroesophageal reflux disease)     Headache     h/o    Hyperlipidemia     Hypertension     Infectious viral hepatitis     cure    Low back pain     Lumbosacral disc disease     Peripheral neuropathy     Sleep apnea     doesnt use machine     Wears glasses      Past Surgical History:   Procedure Laterality Date    BACK SURGERY      FACIAL RECONSTRUCTION SURGERY      long time ago    LUMBAR DISCECTOMY Left 11/08/2019    Procedure: LUMBAR LAMINECTOMY L4-5 LEFT REMOVAL CYST;  Surgeon: Nav Douglass MD;  Location: [facility];  Service: Neurosurgery    LUMBAR SYNOVIAL CYST REMOVAL      WISDOM TOOTH EXTRACTION       Family History   Problem Relation Age of Onset     Cancer Mother     Heart disease Father     Diabetes Father      Social History     Socioeconomic History    Marital status:    Tobacco Use    Smoking status: Former     Current packs/day: 0.00     Average packs/day: 0.5 packs/day for 27.0 years (13.5 ttl pk-yrs)     Types: Cigarettes     Start date: 1977     Quit date:      Years since quittin.0     Passive exposure: Past    Smokeless tobacco: Never   Vaping Use    Vaping status: Never Used   Substance and Sexual Activity    Alcohol use: No    Drug use: No    Sexual activity: Not Currently     Partners: Female     Birth control/protection: None           No Known Allergies    Current Outpatient Medications on File Prior to Visit   Medication Sig Dispense Refill    amLODIPine (NORVASC) 10 MG tablet Take 1 tablet by mouth Daily.      aspirin 81 MG EC tablet Take 1 tablet by mouth Daily.      buPROPion XL (WELLBUTRIN XL) 150 MG 24 hr tablet Take 2 tablets by mouth Daily.      fexofenadine (ALLEGRA) 180 MG tablet Take 1 tablet by mouth Daily.      fluticasone (FLONASE) 50 MCG/ACT nasal spray 2 sprays by Each Nare route Daily.      furosemide (LASIX) 20 MG tablet Take 1 tablet by mouth Daily.      glimepiride (AMARYL) 1 MG tablet Take 4 tablets by mouth Every Morning Before Breakfast.      lisinopril (PRINIVIL,ZESTRIL) 40 MG tablet Take 1 tablet by mouth 2 (Two) Times a Day.      metFORMIN (GLUCOPHAGE) 1000 MG tablet Take 1 tablet by mouth 2 (Two) Times a Day With Meals.      omeprazole (priLOSEC) 20 MG capsule Take 1 capsule by mouth Daily.      oxyCODONE-acetaminophen (PERCOCET) 7.5-325 MG per tablet Take 1 tablet by mouth Every 6 (Six) Hours As Needed.      pravastatin (PRAVACHOL) 20 MG tablet Take 1 tablet by mouth Daily.      sertraline (ZOLOFT) 100 MG tablet Take 2 tablets by mouth Daily.       No current facility-administered medications on file prior to visit.          Physical Exam:   There were no vitals taken for this visit.  Deferred      Medical Decision Making     Data Review:   (All imaging studies were personally reviewed unless stated otherwise)  Plain flexion-extension films demonstrate a couple millimeters of movement from flexion to extension where there is a grade 1 listhesis.     MRI study dated 11/25/2024 demonstrates normal alignment.  I do not see a listhesis.  There is some diffuse degenerative disc disease.  The study is quite subpar.  The axial images are of little utility.  I believe there is a joint effusion on the right at L4-5.  There is some degree of cyst but is difficult to tell where it is situated in the spinal canal given the poor imaging.     Lumbar CT myelogram demonstrates a very large filling defect at L4-5 more on the right.  There is however some overall effacement of the thecal sac.  Generous laminotomy defect is noted on the left at L4-5.  There are some splaying of the facets.  There is about 3 mm of offset on the upright flexion-extension films at L4-5.  This listhesis that reduces completely on the recumbent MRI images.        Diagnosis:   1.  Lumbar stenosis with neurogenic claudication.  2.  Right L4-5 synovial cyst.  3.  L4-5 spondylolisthesis with instability.     Treatment Options:   Given his ongoing severe symptoms I recommended L4-5 decompression with fusion and stabilization.  The nature of the procedure as well as the potential risks, complications, limitations, and alternatives to the procedure were discussed at length with the patient and the patient has agreed to proceed with surgery.  His wife was present for today's discussion.

## 2025-01-31 NOTE — H&P (VIEW-ONLY)
Patient: Delonte Hernandes  : 1960     Primary Care Provider: Felipa Pereira MD     Requesting Provider: As above           History     Chief Complaint: Low back and bilateral lower extremity pain with walking and standing intolerance.     History of Present Illness: Mr. Hernandes is a 64-year-old retired  who is known to our service. On 2019 the patient underwent left L4-5 laminotomy and cyst removal by my former colleague Dr. Douglass. He did quite well. Over last several months he has developed increasing back pain that extends into both legs. He has fallen frequently. His legs will give out on him. He reports no numbness. He has no bowel or bladder dysfunction. At this point there is no position that really makes him feel better.  He reports no change in his symptoms.      Review of Systems   Constitutional:  Negative for activity change, appetite change, chills, diaphoresis, fatigue, fever and unexpected weight change.   HENT:  Negative for congestion, dental problem, drooling, ear discharge, ear pain, facial swelling, hearing loss, mouth sores, nosebleeds, postnasal drip, rhinorrhea, sinus pressure, sinus pain, sneezing, sore throat, tinnitus, trouble swallowing and voice change.    Eyes:  Negative for photophobia, pain, discharge, redness, itching and visual disturbance.   Respiratory:  Negative for apnea, cough, choking, chest tightness, shortness of breath, wheezing and stridor.    Cardiovascular:  Negative for chest pain, palpitations and leg swelling.   Gastrointestinal:  Negative for abdominal distention, abdominal pain, anal bleeding, blood in stool, constipation, diarrhea, nausea, rectal pain and vomiting.   Endocrine: Negative for cold intolerance, heat intolerance, polydipsia, polyphagia and polyuria.   Genitourinary:  Negative for decreased urine volume, difficulty urinating, dysuria, enuresis, flank pain, frequency, genital sores, hematuria, penile discharge, penile pain,  penile swelling, scrotal swelling, testicular pain and urgency.   Musculoskeletal:  Positive for arthralgias, back pain and gait problem. Negative for joint swelling, myalgias, neck pain and neck stiffness.   Skin:  Negative for color change, pallor, rash and wound.   Allergic/Immunologic: Negative for environmental allergies, food allergies and immunocompromised state.   Neurological:  Negative for dizziness, tremors, seizures, syncope, facial asymmetry, speech difficulty, weakness, light-headedness, numbness and headaches.   Hematological:  Negative for adenopathy. Does not bruise/bleed easily.   Psychiatric/Behavioral:  Negative for agitation, behavioral problems, confusion, decreased concentration, dysphoric mood, hallucinations, self-injury, sleep disturbance and suicidal ideas. The patient is not nervous/anxious and is not hyperactive.    All other systems reviewed and are negative.     The patient's past medical history, past surgical history, family history, and social history have been reviewed at length in the electronic medical record.     Past Medical History:   Diagnosis Date    Arthritis     Brain concussion     Cataract     bilat- still present - mild     Claustrophobia     Diabetes mellitus     checks sugar couple times per week     GERD (gastroesophageal reflux disease)     Headache     h/o    Hyperlipidemia     Hypertension     Infectious viral hepatitis     cure    Low back pain     Lumbosacral disc disease     Peripheral neuropathy     Sleep apnea     doesnt use machine     Wears glasses      Past Surgical History:   Procedure Laterality Date    BACK SURGERY      FACIAL RECONSTRUCTION SURGERY      long time ago    LUMBAR DISCECTOMY Left 11/08/2019    Procedure: LUMBAR LAMINECTOMY L4-5 LEFT REMOVAL CYST;  Surgeon: Nav Douglass MD;  Location: [facility];  Service: Neurosurgery    LUMBAR SYNOVIAL CYST REMOVAL      WISDOM TOOTH EXTRACTION       Family History   Problem Relation Age of Onset     Cancer Mother     Heart disease Father     Diabetes Father      Social History     Socioeconomic History    Marital status:    Tobacco Use    Smoking status: Former     Current packs/day: 0.00     Average packs/day: 0.5 packs/day for 27.0 years (13.5 ttl pk-yrs)     Types: Cigarettes     Start date: 1977     Quit date:      Years since quittin.0     Passive exposure: Past    Smokeless tobacco: Never   Vaping Use    Vaping status: Never Used   Substance and Sexual Activity    Alcohol use: No    Drug use: No    Sexual activity: Not Currently     Partners: Female     Birth control/protection: None           No Known Allergies    Current Outpatient Medications on File Prior to Visit   Medication Sig Dispense Refill    amLODIPine (NORVASC) 10 MG tablet Take 1 tablet by mouth Daily.      aspirin 81 MG EC tablet Take 1 tablet by mouth Daily.      buPROPion XL (WELLBUTRIN XL) 150 MG 24 hr tablet Take 2 tablets by mouth Daily.      fexofenadine (ALLEGRA) 180 MG tablet Take 1 tablet by mouth Daily.      fluticasone (FLONASE) 50 MCG/ACT nasal spray 2 sprays by Each Nare route Daily.      furosemide (LASIX) 20 MG tablet Take 1 tablet by mouth Daily.      glimepiride (AMARYL) 1 MG tablet Take 4 tablets by mouth Every Morning Before Breakfast.      lisinopril (PRINIVIL,ZESTRIL) 40 MG tablet Take 1 tablet by mouth 2 (Two) Times a Day.      metFORMIN (GLUCOPHAGE) 1000 MG tablet Take 1 tablet by mouth 2 (Two) Times a Day With Meals.      omeprazole (priLOSEC) 20 MG capsule Take 1 capsule by mouth Daily.      oxyCODONE-acetaminophen (PERCOCET) 7.5-325 MG per tablet Take 1 tablet by mouth Every 6 (Six) Hours As Needed.      pravastatin (PRAVACHOL) 20 MG tablet Take 1 tablet by mouth Daily.      sertraline (ZOLOFT) 100 MG tablet Take 2 tablets by mouth Daily.       No current facility-administered medications on file prior to visit.          Physical Exam:   There were no vitals taken for this visit.  Deferred      Medical Decision Making     Data Review:   (All imaging studies were personally reviewed unless stated otherwise)  Plain flexion-extension films demonstrate a couple millimeters of movement from flexion to extension where there is a grade 1 listhesis.     MRI study dated 11/25/2024 demonstrates normal alignment.  I do not see a listhesis.  There is some diffuse degenerative disc disease.  The study is quite subpar.  The axial images are of little utility.  I believe there is a joint effusion on the right at L4-5.  There is some degree of cyst but is difficult to tell where it is situated in the spinal canal given the poor imaging.     Lumbar CT myelogram demonstrates a very large filling defect at L4-5 more on the right.  There is however some overall effacement of the thecal sac.  Generous laminotomy defect is noted on the left at L4-5.  There are some splaying of the facets.  There is about 3 mm of offset on the upright flexion-extension films at L4-5.  This listhesis that reduces completely on the recumbent MRI images.        Diagnosis:   1.  Lumbar stenosis with neurogenic claudication.  2.  Right L4-5 synovial cyst.  3.  L4-5 spondylolisthesis with instability.     Treatment Options:   Given his ongoing severe symptoms I recommended L4-5 decompression with fusion and stabilization.  The nature of the procedure as well as the potential risks, complications, limitations, and alternatives to the procedure were discussed at length with the patient and the patient has agreed to proceed with surgery.  His wife was present for today's discussion.

## 2025-01-31 NOTE — PROGRESS NOTES
Patient: Delonte Hernandes  : 1960    Primary Care Provider: Felipa Pereira MD    Requesting Provider: As above        History    Chief Complaint: Low back and bilateral lower extremity pain with walking and standing intolerance.    History of Present Illness: Mr. Hernandes is a 64-year-old retired  who is known to our service. On 2019 the patient underwent left L4-5 laminotomy and cyst removal by my former colleague Dr. Douglass. He did quite well. Over last several months he has developed increasing back pain that extends into both legs. He has fallen frequently. His legs will give out on him. He reports no numbness. He has no bowel or bladder dysfunction. At this point there is no position that really makes him feel better.  He reports no change in his symptoms.     Review of Systems   Constitutional:  Negative for activity change, appetite change, chills, diaphoresis, fatigue, fever and unexpected weight change.   HENT:  Negative for congestion, dental problem, drooling, ear discharge, ear pain, facial swelling, hearing loss, mouth sores, nosebleeds, postnasal drip, rhinorrhea, sinus pressure, sinus pain, sneezing, sore throat, tinnitus, trouble swallowing and voice change.    Eyes:  Negative for photophobia, pain, discharge, redness, itching and visual disturbance.   Respiratory:  Negative for apnea, cough, choking, chest tightness, shortness of breath, wheezing and stridor.    Cardiovascular:  Negative for chest pain, palpitations and leg swelling.   Gastrointestinal:  Negative for abdominal distention, abdominal pain, anal bleeding, blood in stool, constipation, diarrhea, nausea, rectal pain and vomiting.   Endocrine: Negative for cold intolerance, heat intolerance, polydipsia, polyphagia and polyuria.   Genitourinary:  Negative for decreased urine volume, difficulty urinating, dysuria, enuresis, flank pain, frequency, genital sores, hematuria, penile discharge, penile pain, penile  swelling, scrotal swelling, testicular pain and urgency.   Musculoskeletal:  Positive for arthralgias, back pain and gait problem. Negative for joint swelling, myalgias, neck pain and neck stiffness.   Skin:  Negative for color change, pallor, rash and wound.   Allergic/Immunologic: Negative for environmental allergies, food allergies and immunocompromised state.   Neurological:  Negative for dizziness, tremors, seizures, syncope, facial asymmetry, speech difficulty, weakness, light-headedness, numbness and headaches.   Hematological:  Negative for adenopathy. Does not bruise/bleed easily.   Psychiatric/Behavioral:  Negative for agitation, behavioral problems, confusion, decreased concentration, dysphoric mood, hallucinations, self-injury, sleep disturbance and suicidal ideas. The patient is not nervous/anxious and is not hyperactive.    All other systems reviewed and are negative.    The patient's past medical history, past surgical history, family history, and social history have been reviewed at length in the electronic medical record.      Physical Exam:   There were no vitals taken for this visit.  Deferred    Medical Decision Making    Data Review:   (All imaging studies were personally reviewed unless stated otherwise)  Plain flexion-extension films demonstrate a couple millimeters of movement from flexion to extension where there is a grade 1 listhesis.     MRI study dated 11/25/2024 demonstrates normal alignment.  I do not see a listhesis.  There is some diffuse degenerative disc disease.  The study is quite subpar.  The axial images are of little utility.  I believe there is a joint effusion on the right at L4-5.  There is some degree of cyst but is difficult to tell where it is situated in the spinal canal given the poor imaging.    Lumbar CT myelogram demonstrates a very large filling defect at L4-5 more on the right.  There is however some overall effacement of the thecal sac.  Generous laminotomy  defect is noted on the left at L4-5.  There are some splaying of the facets.  There is about 3 mm of offset on the upright flexion-extension films at L4-5.  This listhesis that reduces completely on the recumbent MRI images.       Diagnosis:   1.  Lumbar stenosis with neurogenic claudication.  2.  Right L4-5 synovial cyst.  3.  L4-5 spondylolisthesis with instability.    Treatment Options:   Given his ongoing severe symptoms I recommended L4-5 decompression with fusion and stabilization.  The nature of the procedure as well as the potential risks, complications, limitations, and alternatives to the procedure were discussed at length with the patient and the patient has agreed to proceed with surgery.  His wife was present for today's discussion.          I, Dr. Espinoza, personally performed the services described in the documentation, as scribed in my presence, and it is both accurate and complete.

## 2025-02-11 ENCOUNTER — OFFICE VISIT (OUTPATIENT)
Dept: NEUROSURGERY | Facility: CLINIC | Age: 65
End: 2025-02-11
Payer: MEDICARE

## 2025-02-11 DIAGNOSIS — Z53.21 PATIENT LEFT WITHOUT BEING SEEN: Primary | ICD-10-CM

## 2025-02-17 ENCOUNTER — PRE-ADMISSION TESTING (OUTPATIENT)
Dept: PREADMISSION TESTING | Facility: HOSPITAL | Age: 65
End: 2025-02-17
Payer: MEDICARE

## 2025-02-17 VITALS — HEIGHT: 68 IN | WEIGHT: 197.2 LBS | BODY MASS INDEX: 29.89 KG/M2

## 2025-02-17 DIAGNOSIS — M48.062 LUMBAR STENOSIS WITH NEUROGENIC CLAUDICATION: ICD-10-CM

## 2025-02-17 LAB
DEPRECATED RDW RBC AUTO: 39.1 FL (ref 37–54)
ERYTHROCYTE [DISTWIDTH] IN BLOOD BY AUTOMATED COUNT: 12.2 % (ref 12.3–15.4)
HBA1C MFR BLD: 7.8 % (ref 4.8–5.6)
HCT VFR BLD AUTO: 39.4 % (ref 37.5–51)
HGB BLD-MCNC: 13.2 G/DL (ref 13–17.7)
MCH RBC QN AUTO: 29.1 PG (ref 26.6–33)
MCHC RBC AUTO-ENTMCNC: 33.5 G/DL (ref 31.5–35.7)
MCV RBC AUTO: 86.8 FL (ref 79–97)
PLATELET # BLD AUTO: 330 10*3/MM3 (ref 140–450)
PMV BLD AUTO: 8.8 FL (ref 6–12)
RBC # BLD AUTO: 4.54 10*6/MM3 (ref 4.14–5.8)
WBC NRBC COR # BLD AUTO: 8.39 10*3/MM3 (ref 3.4–10.8)

## 2025-02-17 PROCEDURE — 93005 ELECTROCARDIOGRAM TRACING: CPT

## 2025-02-17 PROCEDURE — 83036 HEMOGLOBIN GLYCOSYLATED A1C: CPT

## 2025-02-17 PROCEDURE — 85027 COMPLETE CBC AUTOMATED: CPT

## 2025-02-17 PROCEDURE — 36415 COLL VENOUS BLD VENIPUNCTURE: CPT

## 2025-02-17 PROCEDURE — 87081 CULTURE SCREEN ONLY: CPT

## 2025-02-17 RX ORDER — CYANOCOBALAMIN (VITAMIN B-12) 2500 MCG
2500 TABLET, SUBLINGUAL SUBLINGUAL DAILY
Status: ON HOLD | COMMUNITY

## 2025-02-17 RX ORDER — DOCUSATE SODIUM 100 MG/1
100 CAPSULE, LIQUID FILLED ORAL 2 TIMES DAILY
Status: ON HOLD | COMMUNITY

## 2025-02-17 RX ORDER — CHLORHEXIDINE GLUCONATE 40 MG/ML
SOLUTION TOPICAL
Qty: 120 ML | Refills: 0 | Status: ON HOLD | OUTPATIENT
Start: 2025-02-17 | End: 2025-02-24

## 2025-02-17 RX ORDER — MELOXICAM 15 MG/1
15 TABLET ORAL DAILY
Status: ON HOLD | COMMUNITY
End: 2025-02-24

## 2025-02-17 NOTE — PAT
An arrival time for procedure was not provided during PAT visit. If patient had any questions or concerns about their arrival time, they were instructed to contact their surgeon/physician.  Additionally, if the patient referred to an arrival time that was acquired from their my chart account, patient was encouraged to verify that time with their surgeon/physician. Arrival times are NOT provided in Pre Admission Testing Department.    Per Anesthesia Request, patient instructed not to take their ACE/ARB medications on the AM of surgery.    Patient instructed to drink 20 ounces of Gatorade or Gatorlyte (if diabetic) and it needs to be completed 1 hour (for Main OR patients) or 2 hours (scheduled  section & [facility] patients) before given arrival time for procedure (NO RED Gatorade and NO Gatorade Zero).    Patient verbalized understanding.      Patient's surgeon called in a prescription for Chlorhexidine wash & Bactroban nasal ointment to patient's pharmacy.  Verbal and written instructions given regarding proper use of the Chlorhexidine wash & Bactroban nasal ointment were provided to patient and/or mitchly during PAT visit. Patient/family also instructed to complete Chlorhexidine wash & Bactroban nasal ointment checklist and return it to Pre-op on the day of surgery.  Patient and/or family verbalized understanding.     Patient to apply Chlorhexadine wipes  to surgical area (as instructed) the night before procedure and the AM of procedure. Wipes provided.

## 2025-02-18 LAB — MRSA SPEC QL CULT: NORMAL

## 2025-02-20 ENCOUNTER — TELEPHONE (OUTPATIENT)
Dept: NEUROSURGERY | Facility: CLINIC | Age: 65
End: 2025-02-20
Payer: MEDICARE

## 2025-02-20 LAB
QT INTERVAL: 366 MS
QTC INTERVAL: 447 MS

## 2025-02-20 NOTE — TELEPHONE ENCOUNTER
Provider:  Alexis  Surgery/Procedure:  Lumbar fusion L4-5  Surgery/Procedure Date:  Upcoming on 2/24/25  Last visit:   1/31/25  Next visit: NA     Reason for call:  Patient called to state that anesthesia has made him nauseated in the past, wanted us aware so that we could order an anti-emetic for post op recovery.

## 2025-02-24 ENCOUNTER — ANESTHESIA (OUTPATIENT)
Dept: PERIOP | Facility: HOSPITAL | Age: 65
End: 2025-02-24
Payer: MEDICARE

## 2025-02-24 ENCOUNTER — ANESTHESIA EVENT (OUTPATIENT)
Dept: PERIOP | Facility: HOSPITAL | Age: 65
End: 2025-02-24
Payer: MEDICARE

## 2025-02-24 ENCOUNTER — APPOINTMENT (OUTPATIENT)
Dept: GENERAL RADIOLOGY | Facility: HOSPITAL | Age: 65
End: 2025-02-24
Payer: MEDICARE

## 2025-02-24 ENCOUNTER — HOSPITAL ENCOUNTER (OUTPATIENT)
Facility: HOSPITAL | Age: 65
LOS: 1 days | Discharge: HOME | End: 2025-02-26
Attending: NEUROLOGICAL SURGERY | Admitting: NEUROLOGICAL SURGERY
Payer: MEDICARE

## 2025-02-24 DIAGNOSIS — M48.062 LUMBAR STENOSIS WITH NEUROGENIC CLAUDICATION: ICD-10-CM

## 2025-02-24 DIAGNOSIS — Z98.890 STATUS POST LUMBAR LAMINECTOMY: Primary | ICD-10-CM

## 2025-02-24 LAB
GLUCOSE BLDC GLUCOMTR-MCNC: 236 MG/DL (ref 70–130)
GLUCOSE BLDC GLUCOMTR-MCNC: 245 MG/DL (ref 70–130)
GLUCOSE BLDC GLUCOMTR-MCNC: 286 MG/DL (ref 70–130)
POTASSIUM SERPL-SCNC: 4.3 MMOL/L (ref 3.5–5.2)

## 2025-02-24 PROCEDURE — 63710000001 METFORMIN 1000 MG TABLET: Performed by: NEUROLOGICAL SURGERY

## 2025-02-24 PROCEDURE — 63052 LAM FACETC/FRMT ARTHRD LUM 1: CPT | Performed by: NEUROLOGICAL SURGERY

## 2025-02-24 PROCEDURE — C1713 ANCHOR/SCREW BN/BN,TIS/BN: HCPCS | Performed by: NEUROLOGICAL SURGERY

## 2025-02-24 PROCEDURE — 88304 TISSUE EXAM BY PATHOLOGIST: CPT | Performed by: NEUROLOGICAL SURGERY

## 2025-02-24 PROCEDURE — 25010000002 CEFAZOLIN PER 500 MG: Performed by: NEUROLOGICAL SURGERY

## 2025-02-24 PROCEDURE — 61783 SCAN PROC SPINAL: CPT | Performed by: NEUROLOGICAL SURGERY

## 2025-02-24 PROCEDURE — 22853 INSJ BIOMECHANICAL DEVICE: CPT

## 2025-02-24 PROCEDURE — 22840 INSERT SPINE FIXATION DEVICE: CPT | Performed by: NEUROLOGICAL SURGERY

## 2025-02-24 PROCEDURE — A9270 NON-COVERED ITEM OR SERVICE: HCPCS | Performed by: NEUROLOGICAL SURGERY

## 2025-02-24 PROCEDURE — 25010000002 DEXAMETHASONE PER 1 MG

## 2025-02-24 PROCEDURE — 63710000001 ONDANSETRON ODT 4 MG TABLET DISPERSIBLE: Performed by: NEUROLOGICAL SURGERY

## 2025-02-24 PROCEDURE — 76000 FLUOROSCOPY <1 HR PHYS/QHP: CPT

## 2025-02-24 PROCEDURE — 25010000002 FENTANYL CITRATE (PF) 50 MCG/ML SOLUTION

## 2025-02-24 PROCEDURE — 82948 REAGENT STRIP/BLOOD GLUCOSE: CPT

## 2025-02-24 PROCEDURE — 25010000002 MORPHINE PER 10 MG: Performed by: NEUROLOGICAL SURGERY

## 2025-02-24 PROCEDURE — 22840 INSERT SPINE FIXATION DEVICE: CPT

## 2025-02-24 PROCEDURE — 63710000001 OXYCODONE-ACETAMINOPHEN 7.5-325 MG TABLET: Performed by: NEUROLOGICAL SURGERY

## 2025-02-24 PROCEDURE — 63710000001 PRAVASTATIN 20 MG TABLET: Performed by: NEUROLOGICAL SURGERY

## 2025-02-24 PROCEDURE — 25010000002 ONDANSETRON PER 1 MG

## 2025-02-24 PROCEDURE — 25010000002 VANCOMYCIN 1 G RECONSTITUTED SOLUTION: Performed by: NEUROLOGICAL SURGERY

## 2025-02-24 PROCEDURE — 63052 LAM FACETC/FRMT ARTHRD LUM 1: CPT

## 2025-02-24 PROCEDURE — 25010000002 PROPOFOL 10 MG/ML EMULSION

## 2025-02-24 PROCEDURE — 63710000001 INSULIN LISPRO (HUMAN) PER 5 UNITS: Performed by: NEUROLOGICAL SURGERY

## 2025-02-24 PROCEDURE — 25810000003 LACTATED RINGERS PER 1000 ML: Performed by: ANESTHESIOLOGY

## 2025-02-24 PROCEDURE — 22630 ARTHRD PST TQ 1NTRSPC LUM: CPT | Performed by: NEUROLOGICAL SURGERY

## 2025-02-24 PROCEDURE — 22853 INSJ BIOMECHANICAL DEVICE: CPT | Performed by: NEUROLOGICAL SURGERY

## 2025-02-24 PROCEDURE — 25010000002 FENTANYL CITRATE (PF) 100 MCG/2ML SOLUTION

## 2025-02-24 PROCEDURE — 25010000002 SUGAMMADEX 200 MG/2ML SOLUTION

## 2025-02-24 PROCEDURE — 63710000001 LISINOPRIL 40 MG TABLET: Performed by: NEUROLOGICAL SURGERY

## 2025-02-24 PROCEDURE — 25810000003 SODIUM CHLORIDE PER 500 ML: Performed by: NEUROLOGICAL SURGERY

## 2025-02-24 PROCEDURE — 25010000002 LIDOCAINE PF 1% 1 % SOLUTION: Performed by: ANESTHESIOLOGY

## 2025-02-24 PROCEDURE — 25010000002 HYDROMORPHONE 1 MG/ML SOLUTION

## 2025-02-24 PROCEDURE — 63710000001 POLYETHYLENE GLYCOL 17 G PACK: Performed by: NEUROLOGICAL SURGERY

## 2025-02-24 PROCEDURE — 63710000001 SENNOSIDES-DOCUSATE 8.6-50 MG TABLET: Performed by: NEUROLOGICAL SURGERY

## 2025-02-24 PROCEDURE — 25010000002 LIDOCAINE 1 % SOLUTION

## 2025-02-24 PROCEDURE — 22630 ARTHRD PST TQ 1NTRSPC LUM: CPT

## 2025-02-24 PROCEDURE — 84132 ASSAY OF SERUM POTASSIUM: CPT | Performed by: ANESTHESIOLOGY

## 2025-02-24 DEVICE — SET SCREW 5540030 5.5 TI NS BRK OFF
Type: IMPLANTABLE DEVICE | Site: BACK | Status: FUNCTIONAL
Brand: CD HORIZON® SPINAL SYSTEM

## 2025-02-24 DEVICE — ROD 1555501035 5.5 CCM NS CURV 35MM
Type: IMPLANTABLE DEVICE | Site: BACK | Status: FUNCTIONAL
Brand: CD HORIZON® SPINAL SYSTEM

## 2025-02-24 DEVICE — AVITENE ULTRAFOAM, 8 CM X 12.5 CM (3-1/8" X 5"), 100 SQ CM
Type: IMPLANTABLE DEVICE | Site: BACK | Status: FUNCTIONAL
Brand: AVITENE

## 2025-02-24 DEVICE — DBM T44145 5CC ORTHOBLEND SMALL DEFGRAFT
Type: IMPLANTABLE DEVICE | Site: BACK | Status: FUNCTIONAL
Brand: GRAFTON®AND GRAFTON PLUS®DEMINERALIZED BONE MATRIX (DBM)

## 2025-02-24 DEVICE — SPACER 84332413 ADAPTIX 24MM X 13MM
Type: IMPLANTABLE DEVICE | Site: BACK | Status: FUNCTIONAL
Brand: ADAPTIX™ INTERBODY SYSTEM WITH TITAN NANOLOCK™ SURFACE TECHNOLOGY

## 2025-02-24 DEVICE — MAS 55840026550T 5.5/6.0 FENSTRTD 6.5X50
Type: IMPLANTABLE DEVICE | Site: BACK | Status: FUNCTIONAL
Brand: CD HORIZON® FENESTRATED SCREW SET

## 2025-02-24 DEVICE — ROD 1555501040 5.5 CCM NS CURV 40MM
Type: IMPLANTABLE DEVICE | Site: BACK | Status: FUNCTIONAL
Brand: CD HORIZON® SPINAL SYSTEM

## 2025-02-24 DEVICE — SSC BONE WAX
Type: IMPLANTABLE DEVICE | Site: BACK | Status: FUNCTIONAL
Brand: SSC BONE WAX

## 2025-02-24 DEVICE — FLOSEAL WITH RECOTHROM - 10ML.
Type: IMPLANTABLE DEVICE | Site: BACK | Status: FUNCTIONAL
Brand: FLOSEAL HEMOSTATIC MATRIX

## 2025-02-24 RX ORDER — GLIPIZIDE 5 MG/1
2.5 TABLET ORAL
Status: DISCONTINUED | OUTPATIENT
Start: 2025-02-25 | End: 2025-02-26 | Stop reason: HOSPADM

## 2025-02-24 RX ORDER — EPHEDRINE SULFATE 50 MG/ML
INJECTION INTRAVENOUS AS NEEDED
Status: DISCONTINUED | OUTPATIENT
Start: 2025-02-24 | End: 2025-02-24 | Stop reason: SURG

## 2025-02-24 RX ORDER — AMOXICILLIN 250 MG
2 CAPSULE ORAL 2 TIMES DAILY
Status: DISCONTINUED | OUTPATIENT
Start: 2025-02-24 | End: 2025-02-26 | Stop reason: HOSPADM

## 2025-02-24 RX ORDER — NALOXONE HCL 0.4 MG/ML
0.4 VIAL (ML) INJECTION
Status: DISCONTINUED | OUTPATIENT
Start: 2025-02-24 | End: 2025-02-26 | Stop reason: HOSPADM

## 2025-02-24 RX ORDER — SODIUM CHLORIDE 0.9 % (FLUSH) 0.9 %
3 SYRINGE (ML) INJECTION EVERY 12 HOURS SCHEDULED
Status: DISCONTINUED | OUTPATIENT
Start: 2025-02-24 | End: 2025-02-24

## 2025-02-24 RX ORDER — BUPIVACAINE HYDROCHLORIDE AND EPINEPHRINE 2.5; 5 MG/ML; UG/ML
INJECTION, SOLUTION EPIDURAL; INFILTRATION; INTRACAUDAL; PERINEURAL AS NEEDED
Status: DISCONTINUED | OUTPATIENT
Start: 2025-02-24 | End: 2025-02-24 | Stop reason: HOSPADM

## 2025-02-24 RX ORDER — SERTRALINE HYDROCHLORIDE 100 MG/1
200 TABLET, FILM COATED ORAL DAILY
Status: DISCONTINUED | OUTPATIENT
Start: 2025-02-25 | End: 2025-02-26 | Stop reason: HOSPADM

## 2025-02-24 RX ORDER — LABETALOL HYDROCHLORIDE 5 MG/ML
5 INJECTION, SOLUTION INTRAVENOUS
Status: DISCONTINUED | OUTPATIENT
Start: 2025-02-24 | End: 2025-02-24

## 2025-02-24 RX ORDER — POLYETHYLENE GLYCOL 3350 17 G/17G
17 POWDER, FOR SOLUTION ORAL DAILY PRN
Status: DISCONTINUED | OUTPATIENT
Start: 2025-02-24 | End: 2025-02-26 | Stop reason: HOSPADM

## 2025-02-24 RX ORDER — NALOXONE HCL 0.4 MG/ML
0.4 VIAL (ML) INJECTION AS NEEDED
Status: DISCONTINUED | OUTPATIENT
Start: 2025-02-24 | End: 2025-02-24

## 2025-02-24 RX ORDER — FENTANYL CITRATE 50 UG/ML
INJECTION, SOLUTION INTRAMUSCULAR; INTRAVENOUS
Status: COMPLETED
Start: 2025-02-24 | End: 2025-02-24

## 2025-02-24 RX ORDER — SODIUM CHLORIDE, SODIUM LACTATE, POTASSIUM CHLORIDE, CALCIUM CHLORIDE 600; 310; 30; 20 MG/100ML; MG/100ML; MG/100ML; MG/100ML
90 INJECTION, SOLUTION INTRAVENOUS CONTINUOUS
Status: DISCONTINUED | OUTPATIENT
Start: 2025-02-24 | End: 2025-02-25

## 2025-02-24 RX ORDER — DEXTROSE MONOHYDRATE 25 G/50ML
25 INJECTION, SOLUTION INTRAVENOUS
Status: DISCONTINUED | OUTPATIENT
Start: 2025-02-24 | End: 2025-02-26 | Stop reason: HOSPADM

## 2025-02-24 RX ORDER — VANCOMYCIN HYDROCHLORIDE 1 G/20ML
INJECTION, POWDER, LYOPHILIZED, FOR SOLUTION INTRAVENOUS AS NEEDED
Status: DISCONTINUED | OUTPATIENT
Start: 2025-02-24 | End: 2025-02-24 | Stop reason: HOSPADM

## 2025-02-24 RX ORDER — PROMETHAZINE HYDROCHLORIDE 25 MG/1
25 TABLET ORAL ONCE AS NEEDED
Status: DISCONTINUED | OUTPATIENT
Start: 2025-02-24 | End: 2025-02-24

## 2025-02-24 RX ORDER — SODIUM CHLORIDE, SODIUM LACTATE, POTASSIUM CHLORIDE, CALCIUM CHLORIDE 600; 310; 30; 20 MG/100ML; MG/100ML; MG/100ML; MG/100ML
9 INJECTION, SOLUTION INTRAVENOUS CONTINUOUS
Status: ACTIVE | OUTPATIENT
Start: 2025-02-24 | End: 2025-02-25

## 2025-02-24 RX ORDER — FAMOTIDINE 20 MG/1
20 TABLET, FILM COATED ORAL
Status: COMPLETED | OUTPATIENT
Start: 2025-02-24 | End: 2025-02-24

## 2025-02-24 RX ORDER — POLYETHYLENE GLYCOL 3350 17 G/17G
17 POWDER, FOR SOLUTION ORAL DAILY
Status: DISCONTINUED | OUTPATIENT
Start: 2025-02-24 | End: 2025-02-26 | Stop reason: HOSPADM

## 2025-02-24 RX ORDER — SODIUM CHLORIDE 9 MG/ML
40 INJECTION, SOLUTION INTRAVENOUS AS NEEDED
Status: DISCONTINUED | OUTPATIENT
Start: 2025-02-24 | End: 2025-02-26 | Stop reason: HOSPADM

## 2025-02-24 RX ORDER — FLUTICASONE PROPIONATE 50 MCG
2 SPRAY, SUSPENSION (ML) NASAL DAILY
Status: DISCONTINUED | OUTPATIENT
Start: 2025-02-25 | End: 2025-02-26 | Stop reason: HOSPADM

## 2025-02-24 RX ORDER — SODIUM CHLORIDE 9 MG/ML
9 INJECTION, SOLUTION INTRAVENOUS AS NEEDED
Status: DISCONTINUED | OUTPATIENT
Start: 2025-02-24 | End: 2025-02-24

## 2025-02-24 RX ORDER — LIDOCAINE HYDROCHLORIDE 10 MG/ML
INJECTION, SOLUTION INFILTRATION; PERINEURAL AS NEEDED
Status: DISCONTINUED | OUTPATIENT
Start: 2025-02-24 | End: 2025-02-24 | Stop reason: SURG

## 2025-02-24 RX ORDER — FENTANYL CITRATE 50 UG/ML
INJECTION, SOLUTION INTRAMUSCULAR; INTRAVENOUS AS NEEDED
Status: DISCONTINUED | OUTPATIENT
Start: 2025-02-24 | End: 2025-02-24 | Stop reason: SURG

## 2025-02-24 RX ORDER — MAGNESIUM HYDROXIDE 1200 MG/15ML
LIQUID ORAL AS NEEDED
Status: DISCONTINUED | OUTPATIENT
Start: 2025-02-24 | End: 2025-02-24 | Stop reason: HOSPADM

## 2025-02-24 RX ORDER — BUPIVACAINE HCL/0.9 % NACL/PF 0.125 %
PLASTIC BAG, INJECTION (ML) EPIDURAL AS NEEDED
Status: DISCONTINUED | OUTPATIENT
Start: 2025-02-24 | End: 2025-02-24 | Stop reason: SURG

## 2025-02-24 RX ORDER — SCOPOLAMINE 1 MG/3D
1 PATCH, EXTENDED RELEASE TRANSDERMAL ONCE
Status: DISCONTINUED | OUTPATIENT
Start: 2025-02-24 | End: 2025-02-24

## 2025-02-24 RX ORDER — BUPROPION HYDROCHLORIDE 150 MG/1
300 TABLET ORAL DAILY
Status: DISCONTINUED | OUTPATIENT
Start: 2025-02-25 | End: 2025-02-26 | Stop reason: HOSPADM

## 2025-02-24 RX ORDER — LISINOPRIL 40 MG/1
40 TABLET ORAL 2 TIMES DAILY
Status: DISCONTINUED | OUTPATIENT
Start: 2025-02-24 | End: 2025-02-26 | Stop reason: HOSPADM

## 2025-02-24 RX ORDER — SODIUM CHLORIDE 0.9 % (FLUSH) 0.9 %
10 SYRINGE (ML) INJECTION AS NEEDED
Status: DISCONTINUED | OUTPATIENT
Start: 2025-02-24 | End: 2025-02-26 | Stop reason: HOSPADM

## 2025-02-24 RX ORDER — OXYCODONE AND ACETAMINOPHEN 7.5; 325 MG/1; MG/1
1 TABLET ORAL EVERY 4 HOURS PRN
Status: DISCONTINUED | OUTPATIENT
Start: 2025-02-24 | End: 2025-02-24

## 2025-02-24 RX ORDER — ONDANSETRON 4 MG/1
4 TABLET, ORALLY DISINTEGRATING ORAL EVERY 6 HOURS PRN
Status: DISCONTINUED | OUTPATIENT
Start: 2025-02-24 | End: 2025-02-26 | Stop reason: HOSPADM

## 2025-02-24 RX ORDER — DIPHENHYDRAMINE HCL 25 MG
25 CAPSULE ORAL NIGHTLY PRN
Status: DISCONTINUED | OUTPATIENT
Start: 2025-02-24 | End: 2025-02-26 | Stop reason: HOSPADM

## 2025-02-24 RX ORDER — ROCURONIUM BROMIDE 10 MG/ML
INJECTION, SOLUTION INTRAVENOUS AS NEEDED
Status: DISCONTINUED | OUTPATIENT
Start: 2025-02-24 | End: 2025-02-24 | Stop reason: SURG

## 2025-02-24 RX ORDER — SODIUM CHLORIDE 0.9 % (FLUSH) 0.9 %
3-10 SYRINGE (ML) INJECTION AS NEEDED
Status: DISCONTINUED | OUTPATIENT
Start: 2025-02-24 | End: 2025-02-24

## 2025-02-24 RX ORDER — IPRATROPIUM BROMIDE AND ALBUTEROL SULFATE 2.5; .5 MG/3ML; MG/3ML
3 SOLUTION RESPIRATORY (INHALATION) ONCE AS NEEDED
Status: DISCONTINUED | OUTPATIENT
Start: 2025-02-24 | End: 2025-02-24

## 2025-02-24 RX ORDER — SODIUM CHLORIDE 9 MG/ML
INJECTION, SOLUTION INTRAVENOUS AS NEEDED
Status: DISCONTINUED | OUTPATIENT
Start: 2025-02-24 | End: 2025-02-24 | Stop reason: HOSPADM

## 2025-02-24 RX ORDER — CETIRIZINE HYDROCHLORIDE 10 MG/1
5 TABLET ORAL DAILY
Status: DISCONTINUED | OUTPATIENT
Start: 2025-02-25 | End: 2025-02-26 | Stop reason: HOSPADM

## 2025-02-24 RX ORDER — MEPERIDINE HYDROCHLORIDE 25 MG/ML
12.5 INJECTION INTRAMUSCULAR; INTRAVENOUS; SUBCUTANEOUS
Status: DISCONTINUED | OUTPATIENT
Start: 2025-02-24 | End: 2025-02-24

## 2025-02-24 RX ORDER — PROMETHAZINE HYDROCHLORIDE 12.5 MG/1
12.5 TABLET ORAL EVERY 6 HOURS PRN
Status: DISCONTINUED | OUTPATIENT
Start: 2025-02-24 | End: 2025-02-26 | Stop reason: HOSPADM

## 2025-02-24 RX ORDER — IBUPROFEN 800 MG/1
800 TABLET, FILM COATED ORAL ONCE
Status: COMPLETED | OUTPATIENT
Start: 2025-02-24 | End: 2025-02-24

## 2025-02-24 RX ORDER — PRAVASTATIN SODIUM 20 MG
20 TABLET ORAL DAILY
Status: DISCONTINUED | OUTPATIENT
Start: 2025-02-24 | End: 2025-02-26 | Stop reason: HOSPADM

## 2025-02-24 RX ORDER — DEXAMETHASONE SODIUM PHOSPHATE 4 MG/ML
INJECTION, SOLUTION INTRA-ARTICULAR; INTRALESIONAL; INTRAMUSCULAR; INTRAVENOUS; SOFT TISSUE AS NEEDED
Status: DISCONTINUED | OUTPATIENT
Start: 2025-02-24 | End: 2025-02-24 | Stop reason: SURG

## 2025-02-24 RX ORDER — FUROSEMIDE 20 MG/1
20 TABLET ORAL DAILY
Status: DISCONTINUED | OUTPATIENT
Start: 2025-02-25 | End: 2025-02-26 | Stop reason: HOSPADM

## 2025-02-24 RX ORDER — AMLODIPINE BESYLATE 10 MG/1
10 TABLET ORAL DAILY
Status: DISCONTINUED | OUTPATIENT
Start: 2025-02-25 | End: 2025-02-26 | Stop reason: HOSPADM

## 2025-02-24 RX ORDER — HYDRALAZINE HYDROCHLORIDE 20 MG/ML
5 INJECTION INTRAMUSCULAR; INTRAVENOUS
Status: DISCONTINUED | OUTPATIENT
Start: 2025-02-24 | End: 2025-02-24

## 2025-02-24 RX ORDER — ASPIRIN 81 MG/1
81 TABLET ORAL DAILY
Status: DISCONTINUED | OUTPATIENT
Start: 2025-02-25 | End: 2025-02-26 | Stop reason: HOSPADM

## 2025-02-24 RX ORDER — ACETAMINOPHEN 160 MG/5ML
650 SOLUTION ORAL EVERY 4 HOURS PRN
Status: DISCONTINUED | OUTPATIENT
Start: 2025-02-24 | End: 2025-02-26 | Stop reason: HOSPADM

## 2025-02-24 RX ORDER — FENTANYL CITRATE 50 UG/ML
50 INJECTION, SOLUTION INTRAMUSCULAR; INTRAVENOUS
Status: DISCONTINUED | OUTPATIENT
Start: 2025-02-24 | End: 2025-02-24

## 2025-02-24 RX ORDER — SCOPOLAMINE 1 MG/3D
1 PATCH, EXTENDED RELEASE TRANSDERMAL
Status: DISCONTINUED | OUTPATIENT
Start: 2025-02-24 | End: 2025-02-24 | Stop reason: HOSPADM

## 2025-02-24 RX ORDER — PROPOFOL 10 MG/ML
VIAL (ML) INTRAVENOUS AS NEEDED
Status: DISCONTINUED | OUTPATIENT
Start: 2025-02-24 | End: 2025-02-24 | Stop reason: SURG

## 2025-02-24 RX ORDER — HYDROMORPHONE HYDROCHLORIDE 1 MG/ML
0.5 INJECTION, SOLUTION INTRAMUSCULAR; INTRAVENOUS; SUBCUTANEOUS
Status: DISCONTINUED | OUTPATIENT
Start: 2025-02-24 | End: 2025-02-24

## 2025-02-24 RX ORDER — BISACODYL 5 MG/1
5 TABLET, DELAYED RELEASE ORAL DAILY PRN
Status: DISCONTINUED | OUTPATIENT
Start: 2025-02-24 | End: 2025-02-26 | Stop reason: HOSPADM

## 2025-02-24 RX ORDER — PANTOPRAZOLE SODIUM 40 MG/1
40 TABLET, DELAYED RELEASE ORAL
Status: DISCONTINUED | OUTPATIENT
Start: 2025-02-25 | End: 2025-02-26 | Stop reason: HOSPADM

## 2025-02-24 RX ORDER — HYDROCODONE BITARTRATE AND ACETAMINOPHEN 5; 325 MG/1; MG/1
1 TABLET ORAL ONCE AS NEEDED
Status: DISCONTINUED | OUTPATIENT
Start: 2025-02-24 | End: 2025-02-24

## 2025-02-24 RX ORDER — IBUPROFEN 600 MG/1
1 TABLET ORAL
Status: DISCONTINUED | OUTPATIENT
Start: 2025-02-24 | End: 2025-02-26 | Stop reason: HOSPADM

## 2025-02-24 RX ORDER — HYDROCODONE BITARTRATE AND ACETAMINOPHEN 5; 325 MG/1; MG/1
1 TABLET ORAL EVERY 4 HOURS PRN
Status: DISCONTINUED | OUTPATIENT
Start: 2025-02-24 | End: 2025-02-26 | Stop reason: HOSPADM

## 2025-02-24 RX ORDER — ONDANSETRON 2 MG/ML
4 INJECTION INTRAMUSCULAR; INTRAVENOUS ONCE AS NEEDED
Status: DISCONTINUED | OUTPATIENT
Start: 2025-02-24 | End: 2025-02-24

## 2025-02-24 RX ORDER — ACETAMINOPHEN 500 MG
1000 TABLET ORAL ONCE
Status: COMPLETED | OUTPATIENT
Start: 2025-02-24 | End: 2025-02-24

## 2025-02-24 RX ORDER — LIDOCAINE HYDROCHLORIDE 10 MG/ML
0.5 INJECTION, SOLUTION EPIDURAL; INFILTRATION; INTRACAUDAL; PERINEURAL ONCE
Status: COMPLETED | OUTPATIENT
Start: 2025-02-24 | End: 2025-02-24

## 2025-02-24 RX ORDER — OXYCODONE AND ACETAMINOPHEN 7.5; 325 MG/1; MG/1
1 TABLET ORAL EVERY 4 HOURS PRN
Status: DISCONTINUED | OUTPATIENT
Start: 2025-02-24 | End: 2025-02-26 | Stop reason: HOSPADM

## 2025-02-24 RX ORDER — ONDANSETRON 2 MG/ML
INJECTION INTRAMUSCULAR; INTRAVENOUS AS NEEDED
Status: DISCONTINUED | OUTPATIENT
Start: 2025-02-24 | End: 2025-02-24 | Stop reason: SURG

## 2025-02-24 RX ORDER — NICOTINE POLACRILEX 4 MG
15 LOZENGE BUCCAL
Status: DISCONTINUED | OUTPATIENT
Start: 2025-02-24 | End: 2025-02-26 | Stop reason: HOSPADM

## 2025-02-24 RX ORDER — PROMETHAZINE HYDROCHLORIDE 25 MG/1
25 SUPPOSITORY RECTAL ONCE AS NEEDED
Status: DISCONTINUED | OUTPATIENT
Start: 2025-02-24 | End: 2025-02-24

## 2025-02-24 RX ORDER — ONDANSETRON 2 MG/ML
4 INJECTION INTRAMUSCULAR; INTRAVENOUS EVERY 6 HOURS PRN
Status: DISCONTINUED | OUTPATIENT
Start: 2025-02-24 | End: 2025-02-26 | Stop reason: HOSPADM

## 2025-02-24 RX ORDER — OXYCODONE HCL 10 MG/1
10 TABLET, FILM COATED, EXTENDED RELEASE ORAL ONCE
Status: COMPLETED | OUTPATIENT
Start: 2025-02-24 | End: 2025-02-24

## 2025-02-24 RX ORDER — SODIUM CHLORIDE 0.9 % (FLUSH) 0.9 %
10 SYRINGE (ML) INJECTION EVERY 12 HOURS SCHEDULED
Status: DISCONTINUED | OUTPATIENT
Start: 2025-02-24 | End: 2025-02-26 | Stop reason: HOSPADM

## 2025-02-24 RX ORDER — BISACODYL 10 MG
10 SUPPOSITORY, RECTAL RECTAL DAILY PRN
Status: DISCONTINUED | OUTPATIENT
Start: 2025-02-24 | End: 2025-02-26 | Stop reason: HOSPADM

## 2025-02-24 RX ORDER — DEXMEDETOMIDINE HYDROCHLORIDE 4 UG/ML
INJECTION, SOLUTION INTRAVENOUS AS NEEDED
Status: DISCONTINUED | OUTPATIENT
Start: 2025-02-24 | End: 2025-02-24 | Stop reason: SURG

## 2025-02-24 RX ORDER — MORPHINE SULFATE 4 MG/ML
4 INJECTION, SOLUTION INTRAMUSCULAR; INTRAVENOUS EVERY 4 HOURS PRN
Status: DISCONTINUED | OUTPATIENT
Start: 2025-02-24 | End: 2025-02-26 | Stop reason: HOSPADM

## 2025-02-24 RX ORDER — INSULIN LISPRO 100 [IU]/ML
2-9 INJECTION, SOLUTION INTRAVENOUS; SUBCUTANEOUS
Status: DISCONTINUED | OUTPATIENT
Start: 2025-02-24 | End: 2025-02-26 | Stop reason: HOSPADM

## 2025-02-24 RX ORDER — PROMETHAZINE HYDROCHLORIDE 12.5 MG/1
12.5 SUPPOSITORY RECTAL EVERY 6 HOURS PRN
Status: DISCONTINUED | OUTPATIENT
Start: 2025-02-24 | End: 2025-02-26 | Stop reason: HOSPADM

## 2025-02-24 RX ORDER — ACETAMINOPHEN 325 MG/1
650 TABLET ORAL EVERY 4 HOURS PRN
Status: DISCONTINUED | OUTPATIENT
Start: 2025-02-24 | End: 2025-02-26 | Stop reason: HOSPADM

## 2025-02-24 RX ADMIN — PROPOFOL INJECTABLE EMULSION 25 MCG/KG/MIN: 10 INJECTION, EMULSION INTRAVENOUS at 08:18

## 2025-02-24 RX ADMIN — LIDOCAINE HYDROCHLORIDE 0.5 ML: 10 INJECTION, SOLUTION EPIDURAL; INFILTRATION; INTRACAUDAL; PERINEURAL at 06:58

## 2025-02-24 RX ADMIN — SODIUM CHLORIDE, SODIUM LACTATE, POTASSIUM CHLORIDE, CALCIUM CHLORIDE 9 ML/HR: 20; 30; 600; 310 INJECTION, SOLUTION INTRAVENOUS at 07:00

## 2025-02-24 RX ADMIN — ROCURONIUM BROMIDE 40 MG: 10 INJECTION INTRAVENOUS at 08:42

## 2025-02-24 RX ADMIN — MORPHINE SULFATE 4 MG: 4 INJECTION, SOLUTION INTRAMUSCULAR; INTRAVENOUS at 19:03

## 2025-02-24 RX ADMIN — FENTANYL CITRATE 50 MCG: 50 INJECTION, SOLUTION INTRAMUSCULAR; INTRAVENOUS at 11:35

## 2025-02-24 RX ADMIN — POLYETHYLENE GLYCOL 3350 17 G: 17 POWDER, FOR SOLUTION ORAL at 17:58

## 2025-02-24 RX ADMIN — FENTANYL CITRATE 50 MCG: 50 INJECTION, SOLUTION INTRAMUSCULAR; INTRAVENOUS at 12:09

## 2025-02-24 RX ADMIN — EPHEDRINE SULFATE 10 MG: 50 INJECTION INTRAVENOUS at 08:42

## 2025-02-24 RX ADMIN — FENTANYL CITRATE 100 MCG: 50 INJECTION, SOLUTION INTRAMUSCULAR; INTRAVENOUS at 07:58

## 2025-02-24 RX ADMIN — Medication 200 MCG: at 09:24

## 2025-02-24 RX ADMIN — HYDROMORPHONE HYDROCHLORIDE 0.5 MG: 1 INJECTION, SOLUTION INTRAMUSCULAR; INTRAVENOUS; SUBCUTANEOUS at 11:45

## 2025-02-24 RX ADMIN — PROPOFOL INJECTABLE EMULSION 200 MG: 10 INJECTION, EMULSION INTRAVENOUS at 07:58

## 2025-02-24 RX ADMIN — EPHEDRINE SULFATE 10 MG: 50 INJECTION INTRAVENOUS at 11:00

## 2025-02-24 RX ADMIN — DEXAMETHASONE SODIUM PHOSPHATE 4 MG: 4 INJECTION, SOLUTION INTRAMUSCULAR; INTRAVENOUS at 08:04

## 2025-02-24 RX ADMIN — SODIUM CHLORIDE 2000 MG: 900 INJECTION INTRAVENOUS at 19:27

## 2025-02-24 RX ADMIN — SODIUM CHLORIDE 2 G: 900 INJECTION INTRAVENOUS at 08:04

## 2025-02-24 RX ADMIN — OXYCODONE AND ACETAMINOPHEN 1 TABLET: 7.5; 325 TABLET ORAL at 20:45

## 2025-02-24 RX ADMIN — ROCURONIUM BROMIDE 20 MG: 10 INJECTION INTRAVENOUS at 10:12

## 2025-02-24 RX ADMIN — LIDOCAINE HYDROCHLORIDE 50 MG: 10 INJECTION, SOLUTION INFILTRATION; PERINEURAL at 07:58

## 2025-02-24 RX ADMIN — ONDANSETRON 4 MG: 4 TABLET, ORALLY DISINTEGRATING ORAL at 20:54

## 2025-02-24 RX ADMIN — Medication 200 MCG: at 08:36

## 2025-02-24 RX ADMIN — OXYCODONE AND ACETAMINOPHEN 1 TABLET: 7.5; 325 TABLET ORAL at 16:57

## 2025-02-24 RX ADMIN — ONDANSETRON 4 MG: 2 INJECTION INTRAMUSCULAR; INTRAVENOUS at 10:55

## 2025-02-24 RX ADMIN — METFORMIN HYDROCHLORIDE 1000 MG: 1000 TABLET ORAL at 17:58

## 2025-02-24 RX ADMIN — ROCURONIUM BROMIDE 20 MG: 10 INJECTION INTRAVENOUS at 10:34

## 2025-02-24 RX ADMIN — INSULIN LISPRO 4 UNITS: 100 INJECTION, SOLUTION INTRAVENOUS; SUBCUTANEOUS at 20:48

## 2025-02-24 RX ADMIN — ROCURONIUM BROMIDE 60 MG: 10 INJECTION INTRAVENOUS at 07:58

## 2025-02-24 RX ADMIN — SUGAMMADEX 200 MG: 100 INJECTION, SOLUTION INTRAVENOUS at 11:11

## 2025-02-24 RX ADMIN — OXYCODONE HYDROCHLORIDE 10 MG: 10 TABLET, FILM COATED, EXTENDED RELEASE ORAL at 06:58

## 2025-02-24 RX ADMIN — PRAVASTATIN SODIUM 20 MG: 20 TABLET ORAL at 20:46

## 2025-02-24 RX ADMIN — SODIUM CHLORIDE 2000 MG: 900 INJECTION INTRAVENOUS at 23:49

## 2025-02-24 RX ADMIN — Medication 100 MCG: at 08:50

## 2025-02-24 RX ADMIN — HYDROMORPHONE HYDROCHLORIDE 0.5 MG: 1 INJECTION, SOLUTION INTRAMUSCULAR; INTRAVENOUS; SUBCUTANEOUS at 11:57

## 2025-02-24 RX ADMIN — IBUPROFEN 800 MG: 800 TABLET, FILM COATED ORAL at 06:58

## 2025-02-24 RX ADMIN — LISINOPRIL 40 MG: 40 TABLET ORAL at 20:46

## 2025-02-24 RX ADMIN — FAMOTIDINE 20 MG: 20 TABLET, FILM COATED ORAL at 06:58

## 2025-02-24 RX ADMIN — ACETAMINOPHEN 1000 MG: 500 TABLET, FILM COATED ORAL at 06:58

## 2025-02-24 RX ADMIN — DEXMEDETOMIDINE HYDROCHLORIDE IN 0.9% SODIUM CHLORIDE 12 MCG: 4 INJECTION INTRAVENOUS at 08:03

## 2025-02-24 RX ADMIN — SCOPOLAMINE 1 PATCH: 1.5 PATCH, EXTENDED RELEASE TRANSDERMAL at 07:51

## 2025-02-24 RX ADMIN — Medication 100 MCG: at 08:30

## 2025-02-24 RX ADMIN — ROCURONIUM BROMIDE 50 MG: 10 INJECTION INTRAVENOUS at 09:21

## 2025-02-24 RX ADMIN — SENNOSIDES AND DOCUSATE SODIUM 2 TABLET: 50; 8.6 TABLET ORAL at 20:46

## 2025-02-24 RX ADMIN — INSULIN LISPRO 6 UNITS: 100 INJECTION, SOLUTION INTRAVENOUS; SUBCUTANEOUS at 17:58

## 2025-02-24 NOTE — INTERVAL H&P NOTE
[facility] Pre-op    Full history and physical note from office is attached.    VS: /94  HR 89  RR 16  T 97.2  Sat 98%RA    Immunizations:  Influenza:  UTD  Pneumococcal:  UTD  Tetanus:  Unknown    Review of Systems:  Constitutional-- No fever, chills or sweats. No fatigue.  CV-- No chest pain, palpitation or syncope  Resp-- No cough, hemoptysis. +SOB  Skin--No rashes or lesions    Physical Exam:  Heart:   Regular rate and rhythm, S1 and S2 normal  Lungs: Clear to auscultation bilaterally, respirations unlabored    LAB Results:  Lab Results   Component Value Date    WBC 8.39 02/17/2025    HGB 13.2 02/17/2025    HCT 39.4 02/17/2025    MCV 86.8 02/17/2025     02/17/2025    GLUCOSE 176 (H) 11/01/2019    BUN 13 11/01/2019    CREATININE 1.15 11/01/2019    EGFRIFNONA 65 11/01/2019     (L) 11/01/2019    K 4.4 11/08/2019    CL 95 (L) 11/01/2019    CO2 28.0 11/01/2019    CALCIUM 9.8 11/01/2019     Study Result    Narrative & Impression   CT LUMBAR SPINE W INTRATHECAL CONTRAST     Date of Exam: 1/28/2025 7:18 AM EST     Indication: spondylolisthesis.     Comparison: 11/25/2024 MR lumbar spine     Technique: Axial sections were obtained of the lumbar spine with reformatted images following the injection of intrathecal contrast. The localizer images are reviewed.        Findings:  Motion-degraded exam.     SPINE:     Alignment: Normal.     Vertebrae: Vertebral bodies and posterior elements are intact without acute fracture. Postoperative changes of prior laminectomy at L4-5. Multi-level degenerative changes as follows:     L1-L2: No bony spinal canal or neuroforaminal stenosis.     L2-L3: Mild facet arthropathy. No bony spinal canal or neuroforaminal stenosis.     L3-L4: Mild facet arthropathy. No bony spinal canal or neuroforaminal stenosis.     L4-L5: Postoperative changes of left laminectomy. There appears to be a synovial cyst emanating from the right. This results in mild canal stenosis  with severe narrowing of the right lateral recess. There is mild left and severe right neural foraminal   stenosis.     L5-S1: Posterior disc bulge and facet arthropathy. Mild canal stenosis. Mild right greater than left neural foraminal stenosis.     Spinal canal and extra-vertebral soft tissues: No fluid collection either in the epidural space or in the paraspinal soft tissues, including in the paraspinal muscles.  No dural ectasia or meningeal diverticulum.  No perineural CSF cysts.  Epidural veins   are not dilated.     OTHER FINDINGS:     Visualized abdomen and pelvis:  Aortic vascular calcification.     IMPRESSION:  Impression:  1.Postoperative changes of left laminectomy at L4-5.  2.There appears to be a synovial cyst emanating from the right L4-5 facet joint with mild canal stenosis, severe narrowing of the right lateral recess and severe right neural foraminal stenosis.  3.Mild canal stenosis at L5-S1.     Cancer Staging (if applicable)  Cancer Patient: __ yes __no __unknown__N/A; If yes, clinical stage T:__ N:__M:__, stage group or __N/A      Impression: Lumbar stenosis with neurogenic claudication       Plan: LUMBAR FUSION DECOMPRESSON WITH PEDICLE SCREWS L4-L5       Stacia De La Fuente, APRN   2/24/2025   06:56 EST

## 2025-02-24 NOTE — LETTER
[facility]  [facility]    Patient: Delonte Hernandes  : 1960      Dear Dr. Pereira,    I just completed the lumbar decompression with fusion and stabilization at the L4-5 level on Mr. Hernandes to address his severe back and lower extremity pain.  Thus far all has gone well.  He will likely be hospitalized for couple of days.      With kindest regards,    Ellis Espinoza MD  25  11:19 EST

## 2025-02-24 NOTE — OP NOTE
NEUROSURGICAL OPERATIVE NOTE        PREOPERATIVE DIAGNOSIS:    L4-5 spondylolisthesis, stenosis, instability  Right L4-5 synovial cyst      POSTOPERATIVE DIAGNOSIS:  Same      PROCEDURE:  1.  Arthrodesis interbody type L4-5  2.  L4-5 laminectomy with partial facetectomies and foraminotomies  3.  Bilateral L4-5 discectomy with bilateral Adaptix cage placement  4.  Nonsegmental pedicle screw fixation L4-5 utilizing Solera 5.5  5.  Removal of right L4-5 synovial cyst  6.  Use of [place] and local autograft  7.  Stealth stereotaxy utilized in conjunction with O arm imaging for interoperative navigation      SURGEON:  Ellis Espinoza M.D.      ASSISTANT: Fang Soto PA-C    PAC assisted with:   Suctioning   Retraction   Tying   Suturing   Closing   Application of dressing   Skilled neurosurgery PA assistance was necessary to perform this procedure.        ANESTHESIA:  General      ESTIMATED BLOOD LOSS: 200 mL      SPECIMEN: Synovial cyst      DRAINS: Hemovac      COMPLICATIONS:  None      Spinal Surgery Levels Completed:1 Level        CLINICAL NOTE:  The patient is a 61-year-old gentleman who remotely underwent lumbar decompression on the left at L4-5 by one of my former colleagues.  He is develop progressive back and lower extremity pain.  He has a spondylolisthesis at L4-5 with a high-grade stenosis that is in part due to a large right-sided synovial cyst.  Given his stenosis and instability presents at this time for decompression with fusion and stabilization.  The nature of the procedure as well as the potential risks, complications, limitations, and alternatives to the procedure were discussed at length with the patient and the patient has agreed to proceed with surgery.      TECHNICAL NOTE:  The patient was brought to the operating room and while on his cart general endotracheal anesthesia was achieved. He was then turned prone onto the Rodrick table. Special care was ensured to protect pressure points.  His low back was prepared and draped in the usual fashion. A several centimeter vertical incision was fashioned overlying the L4-5 level. Underlying tissues were divided with cautery to provide exposure to the posterior spinal elements. Reference frame was affixed to a spinous process. O-arm imaging ensued. These images were downloaded into the Stealth station. Using Stealth frameless stereotaxy each of the pedicle screw hole sites at L4 and L5 were marked, drilled and tapped. Then 6.5 mm diameter screws were utilized throughout. The screws at L4 were 50 mm in length bilaterally. The screws at L5 were 45 mm in length bilaterally. [place] rongeur was then utilized to remove the residual spinous process at L4 and the upper aspect of L5. A good deal of granulation tissue from his prior intervention was tediously removed. The thecal sac was decompressed. Extensive granulation tissue was taken down on the left side. The facet complex was subtotally resected. Good decompression of the L4 and L5 nerve roots was accomplished. I then worked contralaterally where a very large synovial cyst was removed. This was adherent to the dura and substantially compromising the entire dural sac. Once again the dural sac and nerve roots were well decompressed. The C-arm was brought into use and beginning on the right the disc at L4-5 was incised and evacuated piecemeal with an array of pituitary rongeurs, curets and punches. Serial impactors were impacted into the disc space. Ultimately a 13 x 24 mm Adaptix cage was impacted into place. Using fluoroscopic guidance this had been packed with a fusion substrate consisting of [place] and local autograft. Attention was turned to the contralateral side where the disc space was prepared and prior to placing the second 13 x 24 mm Adaptix cage some of the fusion substrate was placed anteriorly and in the midline. The second cage was placed. Nerve roots were inspected and found to be unencumbered.  Solera rods measuring 40 mm on the left and 35 mm on the right were applied to the screw heads. Set screws were applied. Compression across the disc space was performed prior to tightening and breaking off the set screws per routine. The wound was washed out with a saline solution. Some Gelfoam was left in the gutters. A Hemovac drain was brought in through a separate stab incision and left in the epidural space. Vancomycin powder was sprinkled in the depths and then more superficially as the wound was closed. The paraspinous muscle and fascia were reapproximated in interrupted fashion with 0 Vicryl suture. Then 0.25% Marcaine was instilled in the paraspinous musculature and subcutaneous tissues. The subcutaneous tissues were closed in layers with 2-0 followed by 3-0 Vicryl suture. Skin was closed in a running subcuticular fashion with 3-0 Vicryl suture. Steri-strips and a sterile dressing were applied. He was rolled onto his cart, extubated, and taken to the recovery room in satisfactory condition. There were no overt intraoperative complications.            Ellis Espinoza M.D.

## 2025-02-24 NOTE — ANESTHESIA POSTPROCEDURE EVALUATION
Patient: Delonte Hernandes    Procedure Summary       Date: 02/24/25 Room / Location:  [facility]  [facility]    Anesthesia Start: 0754 Anesthesia Stop: 1125    Procedure: LUMBAR FUSION DECOMPRESSON WITH PEDICLE SCREWS L4-L5, REMOVAL OF SYNOVIAL CYST (Spine Lumbar) Diagnosis:       Lumbar stenosis with neurogenic claudication      (Lumbar stenosis with neurogenic claudication [M48.062])    Surgeons: Ellis Espinoza MD Provider: Guicho Godinez MD    Anesthesia Type: general ASA Status: 3            Anesthesia Type: general    Vitals  Vitals Value Taken Time   /78 02/24/25 1125   Temp 97.3 °F (36.3 °C) 02/24/25 1125   Pulse 73 02/24/25 1125   Resp 20 02/24/25 1125   SpO2 98 % 02/24/25 1125           Post Anesthesia Care and Evaluation    Patient location during evaluation: PACU  Patient participation: waiting for patient participation  Level of consciousness: sleepy but conscious  Pain management: adequate    Airway patency: patent  Anesthetic complications: No anesthetic complications  PONV Status: none  Cardiovascular status: hemodynamically stable and acceptable  Respiratory status: nonlabored ventilation, acceptable, nasal cannula and oral airway  Hydration status: acceptable

## 2025-02-24 NOTE — ANESTHESIA PREPROCEDURE EVALUATION
Anesthesia Evaluation     Patient summary reviewed and Nursing notes reviewed   history of anesthetic complications:  PONV               Airway   Mallampati: II  TM distance: >3 FB  Neck ROM: full  No difficulty expected  Dental - normal exam     Comment: MANY MISSING    Pulmonary - normal exam   (+) a smoker Former,sleep apnea  Cardiovascular - normal exam    (+) hypertension, hyperlipidemia      Neuro/Psych  (+) headaches, numbness  GI/Hepatic/Renal/Endo    (+) GERD, diabetes mellitus    Musculoskeletal     Abdominal  - normal exam    Bowel sounds: normal.   Substance History   (+) drug use (MARIJUANA)     OB/GYN negative ob/gyn ROS         Other   arthritis,                   Anesthesia Plan    ASA 3     general     intravenous induction     Anesthetic plan, risks, benefits, and alternatives have been provided, discussed and informed consent has been obtained with: patient.    Plan discussed with CRNA.    CODE STATUS:

## 2025-02-24 NOTE — ANESTHESIA PROCEDURE NOTES
Airway  Urgency: elective    Date/Time: 2/24/2025 8:01 AM  Airway not difficult    General Information and Staff    Patient location during procedure: OR  CRNA/CAA: Stephanie Padgett CRNA    Indications and Patient Condition  Indications for airway management: airway protection    Preoxygenated: yes  MILS not maintained throughout  Mask difficulty assessment: 1 - vent by mask    Final Airway Details  Final airway type: endotracheal airway      Successful airway: ETT  Cuffed: yes   Successful intubation technique: video laryngoscopy  Facilitating devices/methods: intubating stylet  Endotracheal tube insertion site: oral  Blade: Cristobal  Blade size: 3  ETT size (mm): 8.0  Cormack-Lehane Classification: grade I - full view of glottis  Placement verified by: chest auscultation and capnometry   Inital cuff pressure (cm H2O): 7  Measured from: lips  ETT/EBT  to lips (cm): 20  Number of attempts at approach: 1  Assessment: lips, teeth, and gum same as pre-op and atraumatic intubation    Additional Comments  Negative epigastric sounds, Breath sound equal bilaterally with symmetric chest rise and fall. Patient has multiple chipped and missing teeth at baseline.

## 2025-02-25 LAB
ANION GAP SERPL CALCULATED.3IONS-SCNC: 14 MMOL/L (ref 5–15)
BUN SERPL-MCNC: 14 MG/DL (ref 8–23)
BUN/CREAT SERPL: 12.1 (ref 7–25)
CALCIUM SPEC-SCNC: 9 MG/DL (ref 8.6–10.5)
CHLORIDE SERPL-SCNC: 99 MMOL/L (ref 98–107)
CO2 SERPL-SCNC: 23 MMOL/L (ref 22–29)
CREAT SERPL-MCNC: 1.16 MG/DL (ref 0.76–1.27)
CYTO UR: NORMAL
EGFRCR SERPLBLD CKD-EPI 2021: 70.8 ML/MIN/1.73
GLUCOSE BLDC GLUCOMTR-MCNC: 196 MG/DL (ref 70–130)
GLUCOSE BLDC GLUCOMTR-MCNC: 205 MG/DL (ref 70–130)
GLUCOSE BLDC GLUCOMTR-MCNC: 222 MG/DL (ref 70–130)
GLUCOSE BLDC GLUCOMTR-MCNC: 236 MG/DL (ref 70–130)
GLUCOSE SERPL-MCNC: 216 MG/DL (ref 65–99)
HCT VFR BLD AUTO: 32.7 % (ref 37.5–51)
HGB BLD-MCNC: 10.9 G/DL (ref 13–17.7)
LAB AP CASE REPORT: NORMAL
LAB AP CLINICAL INFORMATION: NORMAL
PATH REPORT.FINAL DX SPEC: NORMAL
PATH REPORT.GROSS SPEC: NORMAL
POTASSIUM SERPL-SCNC: 4.1 MMOL/L (ref 3.5–5.2)
SODIUM SERPL-SCNC: 136 MMOL/L (ref 136–145)

## 2025-02-25 PROCEDURE — 80048 BASIC METABOLIC PNL TOTAL CA: CPT | Performed by: NEUROLOGICAL SURGERY

## 2025-02-25 PROCEDURE — A9270 NON-COVERED ITEM OR SERVICE: HCPCS | Performed by: NEUROLOGICAL SURGERY

## 2025-02-25 PROCEDURE — 82948 REAGENT STRIP/BLOOD GLUCOSE: CPT

## 2025-02-25 PROCEDURE — 63710000001 ASPIRIN 81 MG TABLET DELAYED-RELEASE: Performed by: NEUROLOGICAL SURGERY

## 2025-02-25 PROCEDURE — 63710000001 OXYCODONE-ACETAMINOPHEN 7.5-325 MG TABLET: Performed by: NEUROLOGICAL SURGERY

## 2025-02-25 PROCEDURE — 97166 OT EVAL MOD COMPLEX 45 MIN: CPT

## 2025-02-25 PROCEDURE — 63710000001 CETIRIZINE 10 MG TABLET: Performed by: NEUROLOGICAL SURGERY

## 2025-02-25 PROCEDURE — 63710000001 POLYETHYLENE GLYCOL 17 G PACK: Performed by: NEUROLOGICAL SURGERY

## 2025-02-25 PROCEDURE — 63710000001 AMLODIPINE 10 MG TABLET: Performed by: NEUROLOGICAL SURGERY

## 2025-02-25 PROCEDURE — 63710000001 ONDANSETRON ODT 4 MG TABLET DISPERSIBLE: Performed by: NEUROLOGICAL SURGERY

## 2025-02-25 PROCEDURE — 63710000001 PRAVASTATIN 20 MG TABLET: Performed by: NEUROLOGICAL SURGERY

## 2025-02-25 PROCEDURE — 63710000001 METFORMIN 1000 MG TABLET: Performed by: NEUROLOGICAL SURGERY

## 2025-02-25 PROCEDURE — 63710000001 SENNOSIDES-DOCUSATE 8.6-50 MG TABLET: Performed by: NEUROLOGICAL SURGERY

## 2025-02-25 PROCEDURE — 63710000001 SERTRALINE 100 MG TABLET: Performed by: NEUROLOGICAL SURGERY

## 2025-02-25 PROCEDURE — 63710000001 FUROSEMIDE 20 MG TABLET: Performed by: NEUROLOGICAL SURGERY

## 2025-02-25 PROCEDURE — 85018 HEMOGLOBIN: CPT | Performed by: NEUROLOGICAL SURGERY

## 2025-02-25 PROCEDURE — 63710000001 INSULIN LISPRO (HUMAN) PER 5 UNITS: Performed by: NEUROLOGICAL SURGERY

## 2025-02-25 PROCEDURE — 63710000001 PANTOPRAZOLE 40 MG TABLET DELAYED-RELEASE: Performed by: NEUROLOGICAL SURGERY

## 2025-02-25 PROCEDURE — 63710000001 FLUTICASONE 50 MCG/ACT SUSPENSION 16 G BOTTLE: Performed by: NEUROLOGICAL SURGERY

## 2025-02-25 PROCEDURE — 97161 PT EVAL LOW COMPLEX 20 MIN: CPT

## 2025-02-25 PROCEDURE — 97535 SELF CARE MNGMENT TRAINING: CPT

## 2025-02-25 PROCEDURE — 63710000001 LISINOPRIL 40 MG TABLET: Performed by: NEUROLOGICAL SURGERY

## 2025-02-25 PROCEDURE — 63710000001 BUPROPION XL 150 MG TABLET SUSTAINED-RELEASE 24 HOUR: Performed by: NEUROLOGICAL SURGERY

## 2025-02-25 PROCEDURE — 63710000001 HYDROCODONE-ACETAMINOPHEN 5-325 MG TABLET: Performed by: NEUROLOGICAL SURGERY

## 2025-02-25 PROCEDURE — 63710000001 GLIPIZIDE 5 MG TABLET: Performed by: NEUROLOGICAL SURGERY

## 2025-02-25 PROCEDURE — 85014 HEMATOCRIT: CPT | Performed by: NEUROLOGICAL SURGERY

## 2025-02-25 PROCEDURE — 99024 POSTOP FOLLOW-UP VISIT: CPT | Performed by: NEUROLOGICAL SURGERY

## 2025-02-25 RX ADMIN — METFORMIN HYDROCHLORIDE 1000 MG: 1000 TABLET ORAL at 18:12

## 2025-02-25 RX ADMIN — AMLODIPINE BESYLATE 10 MG: 10 TABLET ORAL at 08:20

## 2025-02-25 RX ADMIN — METFORMIN HYDROCHLORIDE 1000 MG: 1000 TABLET ORAL at 08:20

## 2025-02-25 RX ADMIN — LISINOPRIL 40 MG: 40 TABLET ORAL at 08:21

## 2025-02-25 RX ADMIN — ONDANSETRON 4 MG: 4 TABLET, ORALLY DISINTEGRATING ORAL at 10:13

## 2025-02-25 RX ADMIN — FLUTICASONE PROPIONATE 2 SPRAY: 50 SPRAY, METERED NASAL at 08:22

## 2025-02-25 RX ADMIN — Medication 10 ML: at 08:23

## 2025-02-25 RX ADMIN — Medication 10 ML: at 20:20

## 2025-02-25 RX ADMIN — SENNOSIDES AND DOCUSATE SODIUM 2 TABLET: 50; 8.6 TABLET ORAL at 08:20

## 2025-02-25 RX ADMIN — OXYCODONE AND ACETAMINOPHEN 1 TABLET: 7.5; 325 TABLET ORAL at 09:58

## 2025-02-25 RX ADMIN — BUPROPION HYDROCHLORIDE 300 MG: 150 TABLET, EXTENDED RELEASE ORAL at 20:20

## 2025-02-25 RX ADMIN — HYDROCODONE BITARTRATE AND ACETAMINOPHEN 1 TABLET: 5; 325 TABLET ORAL at 12:44

## 2025-02-25 RX ADMIN — HYDROCODONE BITARTRATE AND ACETAMINOPHEN 1 TABLET: 5; 325 TABLET ORAL at 18:12

## 2025-02-25 RX ADMIN — INSULIN LISPRO 2 UNITS: 100 INJECTION, SOLUTION INTRAVENOUS; SUBCUTANEOUS at 12:43

## 2025-02-25 RX ADMIN — PANTOPRAZOLE SODIUM 40 MG: 40 TABLET, DELAYED RELEASE ORAL at 05:24

## 2025-02-25 RX ADMIN — OXYCODONE AND ACETAMINOPHEN 1 TABLET: 7.5; 325 TABLET ORAL at 20:11

## 2025-02-25 RX ADMIN — INSULIN LISPRO 4 UNITS: 100 INJECTION, SOLUTION INTRAVENOUS; SUBCUTANEOUS at 18:12

## 2025-02-25 RX ADMIN — INSULIN LISPRO 4 UNITS: 100 INJECTION, SOLUTION INTRAVENOUS; SUBCUTANEOUS at 20:11

## 2025-02-25 RX ADMIN — OXYCODONE AND ACETAMINOPHEN 1 TABLET: 7.5; 325 TABLET ORAL at 15:45

## 2025-02-25 RX ADMIN — INSULIN LISPRO 4 UNITS: 100 INJECTION, SOLUTION INTRAVENOUS; SUBCUTANEOUS at 08:21

## 2025-02-25 RX ADMIN — HYDROCODONE BITARTRATE AND ACETAMINOPHEN 1 TABLET: 5; 325 TABLET ORAL at 08:20

## 2025-02-25 RX ADMIN — SERTRALINE 200 MG: 100 TABLET, FILM COATED ORAL at 08:20

## 2025-02-25 RX ADMIN — SENNOSIDES AND DOCUSATE SODIUM 2 TABLET: 50; 8.6 TABLET ORAL at 20:11

## 2025-02-25 RX ADMIN — PRAVASTATIN SODIUM 20 MG: 20 TABLET ORAL at 20:11

## 2025-02-25 RX ADMIN — ASPIRIN 81 MG: 81 TABLET, COATED ORAL at 08:20

## 2025-02-25 RX ADMIN — OXYCODONE AND ACETAMINOPHEN 1 TABLET: 7.5; 325 TABLET ORAL at 01:00

## 2025-02-25 RX ADMIN — CETIRIZINE HYDROCHLORIDE 5 MG: 10 TABLET, FILM COATED ORAL at 08:20

## 2025-02-25 RX ADMIN — POLYETHYLENE GLYCOL 3350 17 G: 17 POWDER, FOR SOLUTION ORAL at 08:22

## 2025-02-25 RX ADMIN — LISINOPRIL 40 MG: 40 TABLET ORAL at 20:11

## 2025-02-25 RX ADMIN — OXYCODONE AND ACETAMINOPHEN 1 TABLET: 7.5; 325 TABLET ORAL at 05:23

## 2025-02-25 RX ADMIN — FUROSEMIDE 20 MG: 20 TABLET ORAL at 08:20

## 2025-02-25 RX ADMIN — GLIPIZIDE 2.5 MG: 5 TABLET ORAL at 06:43

## 2025-02-25 NOTE — THERAPY EVALUATION
Patient Name: Delonte Hernandes  : 1960    MRN: [id number]                              Today's Date: 2025       Admit Date: 2025    Visit Dx:     ICD-10-CM ICD-9-CM   1. Lumbar stenosis with neurogenic claudication  M48.062 724.03     Patient Active Problem List   Diagnosis    Synovial cyst    Facet arthritis of lumbar region    Status post lumbar laminectomy    Lumbar stenosis with neurogenic claudication     Past Medical History:   Diagnosis Date    Arthritis     Brain concussion     Cataract     bilat- still present - mild     Claustrophobia     Diabetes mellitus     checks sugar couple times per week     GERD (gastroesophageal reflux disease)     Headache     h/o    Hyperlipidemia     Hypertension     Infectious viral hepatitis     cure    Low back pain     Lumbosacral disc disease     Peripheral neuropathy     Sleep apnea     doesnt use machine     Teeth missing     Wears glasses      Past Surgical History:   Procedure Laterality Date    BACK SURGERY      FACIAL RECONSTRUCTION SURGERY      long time ago    LUMBAR DISCECTOMY Left 2019    Procedure: LUMBAR LAMINECTOMY L4-5 LEFT REMOVAL CYST;  Surgeon: Nav Douglass MD;  Location: [facility];  Service: Neurosurgery    LUMBAR SYNOVIAL CYST REMOVAL      WISDOM TOOTH EXTRACTION        General Information       Row Name 25 0913          OT Time and Intention    Subjective Information no complaints  -TB     Document Type evaluation;therapy note (daily note)  -TB     Mode of Treatment occupational therapy;individual therapy  -TB     Patient Effort good  -TB     Symptoms Noted During/After Treatment none  -TB       Row Name 25 0913          General Information    Patient Profile Reviewed yes  -TB     Prior Level of Function independent:;all household mobility;community mobility;ADL's;driving;using stairs  -TB     Existing Precautions/Restrictions fall;spinal;other (see comments)  Hemovac  -TB     Barriers to Rehab none identified  -TB        Row Name 02/25/25 0913          Occupational Profile    Reason for Services/Referral (Occupational Profile) Occupational decline  -TB     Environmental Supports and Barriers (Occupational Profile) Pt lives with his spouse in a single story home with 6 steps to enter at front and ramp access at back door. Walk-in shower with seat available. Comfort ht commodes. SPC prior to sx. Able to complete ADLs with increased effort.  -TB       Row Name 02/25/25 0913          Living Environment    People in Home spouse  -TB       Row Name 02/25/25 0913          Home Main Entrance    Number of Stairs, Main Entrance six  -TB     Stair Railings, Main Entrance railings safe and in good condition  -TB       Row Name 02/25/25 0913          Stairs Within Home, Primary    Number of Stairs, Within Home, Primary none  -TB       Row Name 02/25/25 0913          Cognition    Orientation Status (Cognition) oriented x 4  -TB       Row Name 02/25/25 0913          Safety Issues/Impairments Affecting Functional Mobility    Safety Issues Affecting Function (Mobility) awareness of need for assistance;safety precaution awareness  Education reviewed for spinal precautions  -TB     Impairments Affecting Function (Mobility) balance;endurance/activity tolerance;pain;strength  -TB     Comment, Safety Issues/Impairments (Mobility) Pt is up in room with RW support and CGAx1. No dizziness or LOB.  -TB               User Key  (r) = Recorded By, (t) = Taken By, (c) = Cosigned By      Initials Name Provider Type    TB Anny Cardozo OT Occupational Therapist                     Mobility/ADL's       Row Name 02/25/25 0916          Bed Mobility    Bed Mobility rolling right;sidelying-sit  -TB     Rolling Right [place] (Bed Mobility) standby assist;verbal cues  -TB     Sidelying-Sit [place] (Bed Mobility) standby assist;verbal cues  -TB     Assistive Device (Bed Mobility) bed rails  -TB     Comment, (Bed Mobility) Education reviewed for  spinal precautions and logroll sequencing. Pt demonstrates good understanding.  -TB       Row Name 02/25/25 0916          Transfers    Transfers sit-stand transfer;stand-sit transfer;bed-chair transfer  -TB     Comment, (Transfers) Education and cues for hand placement  -TB       Row Name 02/25/25 0916          Bed-Chair Transfer    Bed-Chair [place] (Transfers) contact guard;1 person assist;verbal cues  -TB     Assistive Device (Bed-Chair Transfers) walker, front-wheeled  -TB       Row Name 02/25/25 0916          Sit-Stand Transfer    Sit-Stand [place] (Transfers) contact guard;1 person assist;verbal cues  -TB     Assistive Device (Sit-Stand Transfers) walker, front-wheeled  -TB       Row Name 02/25/25 0916          Stand-Sit Transfer    Stand-Sit [place] (Transfers) contact guard;1 person assist;verbal cues  -TB     Assistive Device (Stand-Sit Transfers) walker, front-wheeled  -TB       Row Name 02/25/25 0916          Functional Mobility    Functional Mobility- Ind. Level contact guard assist;1 person;verbal cues required  -TB     Functional Mobility- Device walker, front-wheeled  -TB     Functional Mobility-Distance (Feet) 40  -TB     Functional Mobility- Safety Issues balance decreased during turns  -TB     Functional Mobility- Comment Pt is up in room with good effort. No dizziness or LOB.  -TB     Patient was able to Ambulate yes  -TB       Row Name 02/25/25 0916          Activities of Daily Living    BADL Assessment/Intervention bathing;lower body dressing;feeding  -TB       Row Name 02/25/25 0916          Bathing Assessment/Intervention    [place] Level (Bathing) set up;distal lower extremities/feet  -TB     Assistive Devices (Bathing) long-handled sponge  -TB     Position (Bathing) unsupported sitting  -TB     Comment, (Bathing) Appropriate AE issued and teaching completed for spinal precautions and ADL retraining to maintain.  -TB       Row Name 02/25/25 0916          Lower Body  Dressing Assessment/Training    [place] Level (Lower Body Dressing) lower body dressing skills;moderate assist (50% patient effort)  -TB     Assistive Devices (Lower Body Dressing) reacher;long-handled shoe horn  -TB     Comment, (Lower Body Dressing) Appropriate AE issued and teaching completed for spinal precautions and ADL retraining to maintain. Education completed for sock aide options online. Pt to obtain if desired.  -TB       Row Name 02/25/25 0916          Self-Feeding Assessment/Training    [place] Level (Feeding) independent;liquids to mouth  -TB     Position (Feeding) supported sitting  -TB               User Key  (r) = Recorded By, (t) = Taken By, (c) = Cosigned By      Initials Name Provider Type    TB Anny Cardozo OT Occupational Therapist                   Obj/Interventions       Row Name 02/25/25 0920          Sensory Assessment (Somatosensory)    Sensory Assessment (Somatosensory) UE sensation intact  -[facility] Name 02/25/25 0920          Vision Assessment/Intervention    Visual Impairment/Limitations corrective lenses full-time  -TB       Row Name 02/25/25 0920          Range of Motion Comprehensive    General Range of Motion bilateral upper extremity ROM WNL  -TB     Comment, General Range of Motion BUE AROM intact  -TB       Row Name 02/25/25 0920          Strength Comprehensive (MMT)    Comment, General Manual Muscle Testing (MMT) Assessment BUE intact  -TB       Row Name 02/25/25 0920          Balance    Balance Assessment sitting dynamic balance;sit to stand dynamic balance;standing dynamic balance  -TB     Dynamic Sitting Balance independent  -TB     Position, Sitting Balance unsupported;sitting edge of bed;sitting in chair  -TB     Sit to Stand Dynamic Balance contact guard;1-person assist;verbal cues  -TB     Dynamic Standing Balance contact guard;1-person assist;verbal cues  -TB     Position/Device Used, Standing Balance supported;walker, front-wheeled  -TB      Balance Interventions sitting;standing;sit to stand;dynamic;supported;dynamic reaching;occupation based/functional task;UE activity with balance activity  -TB     Comment, Balance No LOB  -TB               User Key  (r) = Recorded By, (t) = Taken By, (c) = Cosigned By      Initials Name Provider Type    TB Anny Cardozo OT Occupational Therapist                   Goals/Plan       Row Name 02/25/25 0926          Transfer Goal 1 (OT)    Activity/Assistive Device (Transfer Goal 1, OT) toilet;walker, rolling  -TB     [place] Level/Cues Needed (Transfer Goal 1, OT) supervision required  -TB     Time Frame (Transfer Goal 1, OT) 1 day;short term goal (STG)  -TB     Progress/Outcome (Transfer Goal 1, OT) goal ongoing  -TB       Row Name 02/25/25 0926          Dressing Goal 1 (OT)    Activity/Device (Dressing Goal 1, OT) lower body dressing  -TB     Time Frame (Dressing Goal 1, OT) long term goal (LTG);2 days  -TB     Progress/Outcome (Dressing Goal 1, OT) goal ongoing  -TB               User Key  (r) = Recorded By, (t) = Taken By, (c) = Cosigned By      Initials Name Provider Type    Anny Alfred OT Occupational Therapist                   Clinical Impression       Row Name 02/25/25 0921          Pain Assessment    Pretreatment Pain Rating 7/10  -TB     Posttreatment Pain Rating 6/10  -TB     Pain Location back  -TB     Pain Side/Orientation lower  -TB     Pain Management Interventions activity modification encouraged;premedicated for activity;positioning techniques utilized;cold applied  -TB     Response to Pain Interventions activity level improved;activity participation with decreased pain  -TB       Row Name 02/25/25 0921          Plan of Care Review    Plan of Care Reviewed With patient  -TB     Progress improving  -TB     Outcome Evaluation OT IE completed. Pt is A/Ox4 and motivated to work with therapy. Education reviewed for spinal precautions and log roll sequencing. Pt transitions to  EOB sitting with SBA. BUE AROM, strength, and sensation are intact. Pt is up in room and transfering with RW support and CGAx1. No dizziness or LOB. Pt reports improved pain following OOB activity. Appropriate AE issued and teaching initiated for ADL retraining. OT will follow IP. Recommend home with spouse assist when pt is medically ready. No DME needs at this time.  -TB       Row Name 02/25/25 0921          Therapy Assessment/Plan (OT)    Rehab Potential (OT) good  -TB     Criteria for Skilled Therapeutic Interventions Met (OT) yes;meets criteria;skilled treatment is necessary  -TB     Therapy Frequency (OT) daily  -TB       Row Name 02/25/25 0921          Therapy Plan Review/Discharge Plan (OT)    Anticipated Discharge Disposition (OT) home with assist  -TB       Row Name 02/25/25 0921          Vital Signs    Pre Systolic BP Rehab --  RN cleared OT  -TB     Pre SpO2 (%) 95  -TB     O2 Delivery Pre Treatment room air  -TB     Pre Patient Position Supine  -TB     Intra Patient Position Standing  -TB     Post Patient Position Sitting  -TB       Row Name 02/25/25 0921          Positioning and Restraints    Pre-Treatment Position in bed  -TB     Post Treatment Position chair  -TB     In Chair notified nsg;reclined;call light within reach;encouraged to call for assist;exit alarm on;legs elevated  -TB               User Key  (r) = Recorded By, (t) = Taken By, (c) = Cosigned By      Initials Name Provider Type    TB Anny Cardozo, OT Occupational Therapist                   Outcome Measures       Row Name 02/25/25 0927          How much help from another is currently needed...    Putting on and taking off regular lower body clothing? 2  -TB     Bathing (including washing, rinsing, and drying) 3  -TB     Toileting (which includes using toilet bed pan or urinal) 3  -TB     Putting on and taking off regular upper body clothing 4  -TB     Taking care of personal grooming (such as brushing teeth) 4  -TB     Eating  meals 4  -TB     AM-PAC 6 Clicks Score (OT) 20  -TB       Row Name 02/25/25 0927          Functional Assessment    Outcome Measure Options AM-PAC 6 Clicks Daily Activity (OT)  -               User Key  (r) = Recorded By, (t) = Taken By, (c) = Cosigned By      Initials Name Provider Type     Anny Cardozo OT Occupational Therapist                    Occupational Therapy Education       Title: PT OT SLP Therapies (In Progress)       Topic: Occupational Therapy (In Progress)       Point: ADL training (Done)       Description:   Instruct learner(s) on proper safety adaptation and remediation techniques during self care or transfers.   Instruct in proper use of assistive devices.                  Learning Progress Summary            Patient Acceptance, E,D, VU,DU,NR by  at 2/25/2025 0928                      Point: Home exercise program (Not Started)       Description:   Instruct learner(s) on appropriate technique for monitoring, assisting and/or progressing therapeutic exercises/activities.                  Learner Progress:  Not documented in this visit.              Point: Precautions (Done)       Description:   Instruct learner(s) on prescribed precautions during self-care and functional transfers.                  Learning Progress Summary            Patient Acceptance, E,D, VU,DU,NR by  at 2/25/2025 0928                      Point: Body mechanics (Not Started)       Description:   Instruct learner(s) on proper positioning and spine alignment during self-care, functional mobility activities and/or exercises.                  Learner Progress:  Not documented in this visit.                              User Key       Initials Effective Dates Name Provider Type Discipline     07/11/23 -  Anny Cardozo OT Occupational Therapist OT                  OT Recommendation and Plan  Therapy Frequency (OT): daily  Plan of Care Review  Plan of Care Reviewed With: patient  Progress:  improving  Outcome Evaluation: OT IE completed. Pt is A/Ox4 and motivated to work with therapy. Education reviewed for spinal precautions and log roll sequencing. Pt transitions to EOB sitting with SBA. BUE AROM, strength, and sensation are intact. Pt is up in room and transfering with RW support and CGAx1. No dizziness or LOB. Pt reports improved pain following OOB activity. Appropriate AE issued and teaching initiated for ADL retraining. OT will follow IP. Recommend home with spouse assist when pt is medically ready. No DME needs at this time.     Time Calculation:   Evaluation Complexity (OT)  Review Occupational Profile/Medical/Therapy History Complexity: expanded/moderate complexity  Assessment, Occupational Performance/Identification of Deficit Complexity: 3-5 performance deficits  Clinical Decision Making Complexity (OT): detailed assessment/moderate complexity  Overall Complexity of Evaluation (OT): moderate complexity     Time Calculation- OT       Row Name 02/25/25 0815             Time Calculation- OT    OT Start Time 0815  -TB      OT Received On 02/25/25  -TB      OT Goal Re-Cert Due Date 03/07/25  -TB         Timed Charges    80133 - OT Self Care/Mgmt Minutes 15  -TB         Untimed Charges    OT Eval/Re-eval Minutes 40  -TB         Total Minutes    Timed Charges Total Minutes 15  -TB      Untimed Charges Total Minutes 40  -TB       Total Minutes 55  -TB                User Key  (r) = Recorded By, (t) = Taken By, (c) = Cosigned By      Initials Name Provider Type    TB Anny Cardozo OT Occupational Therapist                  Therapy Charges for Today       Code Description Service Date Service Provider Modifiers Qty    [id number]  OT SELF CARE/MGMT/TRAIN EA 15 MIN 2/25/2025 Anny Cardozo OT GO 1    [id number] HC OT EVAL MOD COMPLEXITY 3 2/25/2025 Anny Cardozo OT GO 1                 Anny Cardozo OT  2/25/2025

## 2025-02-25 NOTE — PROGRESS NOTES
NEUROSURGERY PROGRESS NOTE     LOS: 1 day   Patient Care Team:  Felipa Pereira MD as PCP - General (Internal Medicine)  Felipa Pereira MD as Referring Physician (Internal Medicine)    Chief Complaint: Low back and lower extremity pain with walking and standing intolerance.    POD#: 1 Day Post-Op  Procedures:  L4-5 PLIF.  Removal of synovial cyst.    Interval History:   Patient has ambulated in the zelaya and is voiding independently.  Patient Complaints: Incisional pain.  Patient Denies: Lower extremity pain, weakness, sensory alteration.    Vital Signs: Blood pressure 138/84, pulse 83, temperature 98.4 °F (36.9 °C), temperature source Oral, resp. rate 16, SpO2 96%.  Intake/Output:   Intake/Output Summary (Last 24 hours) at 2/25/2025 0605  Last data filed at 2/25/2025 0526  Gross per 24 hour   Intake 1000 ml   Output 3605 ml   Net -2605 ml     Drain output: 175/80 mL.    Physical Exam:  Patient is awake and alert.  He moves about the bed slowly and somewhat uncomfortably.  Motor function is intact in his lower extremities.     Data Review:    H&H pending.  Accu-Cheks: [phone]    Assessment/Plan:  1.  L4-5 spondylolisthesis, stenosis, synovial cyst, and instability status post decompression with fusion and stabilization.  2.  Diabetes mellitus.  3.  Hypertension.  4.  Hyperlipidemia.  5.  Obesity.  6.  Disposition: Mobilize patient.  I anticipate that the patient will be discharged home in a day or two.    Ellis Espinoza MD  02/25/25  06:05 EST

## 2025-02-25 NOTE — PLAN OF CARE
Goal Outcome Evaluation:  Plan of Care Reviewed With: patient           Outcome Evaluation: PT eval completed. Pt presents s/p lumbar fusion w/ deficits in strength, balance, and activity tolerance. Pt ambulated 350 ft w/ FWW, CGA. Pt educated on spinal precautions, log rolling technique, and HEP. Pt would benefit from IP PT services while hospitalized. Recommend home w/ assist at d/c.    Anticipated Discharge Disposition (PT): home with assist

## 2025-02-25 NOTE — THERAPY EVALUATION
Patient Name: Delonte Hernandes  : 1960    MRN: [id number]                              Today's Date: 2025       Admit Date: 2025    Visit Dx:     ICD-10-CM ICD-9-CM   1. Lumbar stenosis with neurogenic claudication  M48.062 724.03     Patient Active Problem List   Diagnosis    Synovial cyst    Facet arthritis of lumbar region    Status post lumbar laminectomy    Lumbar stenosis with neurogenic claudication     Past Medical History:   Diagnosis Date    Arthritis     Brain concussion     Cataract     bilat- still present - mild     Claustrophobia     Diabetes mellitus     checks sugar couple times per week     GERD (gastroesophageal reflux disease)     Headache     h/o    Hyperlipidemia     Hypertension     Infectious viral hepatitis     cure    Low back pain     Lumbosacral disc disease     Peripheral neuropathy     Sleep apnea     doesnt use machine     Teeth missing     Wears glasses      Past Surgical History:   Procedure Laterality Date    BACK SURGERY      FACIAL RECONSTRUCTION SURGERY      long time ago    LUMBAR DISCECTOMY Left 2019    Procedure: LUMBAR LAMINECTOMY L4-5 LEFT REMOVAL CYST;  Surgeon: Nav Douglass MD;  Location:  [facility];  Service: Neurosurgery    LUMBAR LAMINECTOMY WITH FUSION N/A 2025    Procedure: LUMBAR FUSION DECOMPRESSION WITH PEDICLE SCREWS L4-L5, REMOVAL OF SYNOVIAL CYST;  Surgeon: Ellis Espinoza MD;  Location:  [facility];  Service: Neurosurgery;  Laterality: N/A;    LUMBAR SYNOVIAL CYST REMOVAL      WISDOM TOOTH EXTRACTION        General Information       Row Name 25 0854          Physical Therapy Time and Intention    Document Type evaluation  -     Mode of Treatment physical therapy  -       Row Name 25 0854          General Information    Patient Profile Reviewed yes  -     Prior Level of Function independent:;all household mobility;community mobility;gait;transfer;bed mobility;ADL's;driving  Recently using cane d/t back pain. Denies  falls  -     Existing Precautions/Restrictions fall;spinal;other (see comments)  s/p L4-5 lumbar fusion, hemovac  -     Barriers to Rehab none identified  -       Row Name 02/25/25 0854          Living Environment    People in Home spouse  -       Row Name 02/25/25 0854          Home Main Entrance    Number of Stairs, Main Entrance other (see comments);six  also has entry ramp  -     Stair Railings, Main Entrance railing on left side (ascending)  -       Row Name 02/25/25 0854          Stairs Within Home, Primary    Number of Stairs, Within Home, Primary none  -       Row Name 02/25/25 0854          Cognition    Orientation Status (Cognition) oriented x 4  -       Row Name 02/25/25 0854          Safety Issues/Impairments Affecting Functional Mobility    Safety Issues Affecting Function (Mobility) awareness of need for assistance;insight into deficits/self-awareness;safety precaution awareness;safety precautions follow-through/compliance  -     Impairments Affecting Function (Mobility) balance;endurance/activity tolerance;pain;strength  -               User Key  (r) = Recorded By, (t) = Taken By, (c) = Cosigned By      Initials Name Provider Type     Ludy Bello, PT Physical Therapist                   Mobility       Row Name 02/25/25 0857          Bed Mobility    Bed Mobility rolling right;sidelying-sit  -     Rolling Right [place] (Bed Mobility) standby assist  -     Sidelying-Sit [place] (Bed Mobility) standby assist  -     Comment, (Bed Mobility) VCs for hand placement and sequencing. Educated on spinal precautions and log rolling technique  -       Row Name 02/25/25 0857          Transfers    Comment, (Transfers) VCs for hand placement and sequencing w/ FWW  -       Row Name 02/25/25 0857          Sit-Stand Transfer    Sit-Stand [place] (Transfers) contact guard;verbal cues  -     Assistive Device (Sit-Stand Transfers) walker, front-wheeled  -       Row Name  02/25/25 0857          Gait/Stairs (Locomotion)    [place] (Gait) contact guard;verbal cues  -     Assistive Device (Gait) walker, front-wheeled  -     Patient was able to Ambulate yes  -     Distance in Feet (Gait) 350  -     Deviations/Abnormal Patterns (Gait) kevyn decreased;gait speed decreased;stride length decreased  -     Bilateral Gait Deviations forward flexed posture;heel strike decreased  -     Comment, (Gait/Stairs) Pt demo step through gait pattern w/ decreased kevyn and step length. VCs for upright posture and body positioning within walker. No LOB or knee buckling noted. Distance limited by fatigue  -               User Key  (r) = Recorded By, (t) = Taken By, (c) = Cosigned By      Initials Name Provider Type     Ludy Bello, ROSLYN Physical Therapist                   Obj/Interventions       Row Name 02/25/25 0934          Range of Motion Comprehensive    General Range of Motion bilateral lower extremity ROM WFL  -[facility] Name 02/25/25 0934          Strength Comprehensive (MMT)    General Manual Muscle Testing (MMT) Assessment lower extremity strength deficits identified  -     Comment, General Manual Muscle Testing (MMT) Assessment BLEs functionally at least 4/5  -       Row Name 02/25/25 0934          Motor Skills    Therapeutic Exercise hip;knee;ankle;other (see comments)  abdominal sets x10  -       Row Name 02/25/25 0934          Hip (Therapeutic Exercise)    Hip (Therapeutic Exercise) isometric exercises  -     Hip Isometrics (Therapeutic Exercise) bilateral;gluteal sets;10 repetitions  -       Row Name 02/25/25 0934          Knee (Therapeutic Exercise)    Knee (Therapeutic Exercise) isometric exercises  -     Knee Isometrics (Therapeutic Exercise) bilateral;quad sets;10 repetitions  -       Row Name 02/25/25 0934          Ankle (Therapeutic Exercise)    Ankle (Therapeutic Exercise) AROM (active range of motion)  -     Ankle AROM (Therapeutic  Exercise) bilateral;dorsiflexion;plantarflexion;10 repetitions  -[facility] Name 02/25/25 0934          Balance    Balance Assessment sitting static balance;sitting dynamic balance;standing static balance;standing dynamic balance  -     Static Sitting Balance independent  -     Dynamic Sitting Balance independent  -     Position, Sitting Balance unsupported;sitting edge of bed  -     Static Standing Balance contact guard  -     Dynamic Standing Balance contact guard;verbal cues  -     Position/Device Used, Standing Balance supported;walker, front-wheeled  -     Balance Interventions sitting;standing;sit to stand;supported;static;dynamic  -[facility] Name 02/25/25 0934          Sensory Assessment (Somatosensory)    Sensory Assessment (Somatosensory) LE sensation intact  -               User Key  (r) = Recorded By, (t) = Taken By, (c) = Cosigned By      Initials Name Provider Type     Ludy Bello, PT Physical Therapist                   Goals/Plan       Row Name 02/25/25 0942          Bed Mobility Goal 1 (PT)    Activity/Assistive Device (Bed Mobility Goal 1, PT) bed mobility activities, all  -     [place] Level/Cues Needed (Bed Mobility Goal 1, PT) modified independence  -     Time Frame (Bed Mobility Goal 1, PT) short term goal (STG);3 days  -[facility] Name 02/25/25 0942          Transfer Goal 1 (PT)    Activity/Assistive Device (Transfer Goal 1, PT) sit-to-stand/stand-to-sit;bed-to-chair/chair-to-bed  -     [place] Level/Cues Needed (Transfer Goal 1, PT) modified independence  -     Time Frame (Transfer Goal 1, PT) long term goal (LTG);5 days  -LH       Row Name 02/25/25 0942          Gait Training Goal 1 (PT)    Activity/Assistive Device (Gait Training Goal 1, PT) gait (walking locomotion);assistive device use  -     [place] Level (Gait Training Goal 1, PT) modified independence  -     Distance (Gait Training Goal 1, PT) 500 ft  -     Time Frame (Gait  Training Goal 1, PT) long term goal (LTG);5 days  -[facility] Name 02/25/25 0942          Stairs Goal 1 (PT)    Activity/Assistive Device (Stairs Goal 1, PT) ascending stairs;using handrail, left;descending stairs;using handrail, right;assistive device use  -     [place] Level/Cues Needed (Stairs Goal 1, PT) standby assist  -     Number of Stairs (Stairs Goal 1, PT) 6  -     Time Frame (Stairs Goal 1, PT) long term goal (LTG);5 days  -[facility] Name 02/25/25 0942          Therapy Assessment/Plan (PT)    Planned Therapy Interventions (PT) balance training;bed mobility training;gait training;home exercise program;neuromuscular re-education;patient/family education;postural re-education;ROM (range of motion);stair training;strengthening;stretching;transfer training  [facility]               User Key  (r) = Recorded By, (t) = Taken By, (c) = Cosigned By      Initials Name Provider Type     Ludy Bello, PT Physical Therapist                   Clinical Impression       [facility] Name 02/25/25 0937          Pain    Pretreatment Pain Rating 7/10  -     Posttreatment Pain Rating 7/10  -     Pain Location back  -     Pain Side/Orientation lower  -     Pain Management Interventions activity modification encouraged;exercise or physical activity utilized;cold applied  -     Response to Pain Interventions activity level improved;functional ability improved;activity participation with tolerable pain  -[facility] Name 02/25/25 0937          Plan of Care Review    Plan of Care Reviewed With patient  -     Outcome Evaluation PT eval completed. Pt presents s/p lumbar fusion w/ deficits in strength, balance, and activity tolerance. Pt ambulated 350 ft w/ FWW, CGA. Pt educated on spinal precautions, log rolling technique, and HEP. Pt would benefit from IP PT services while hospitalized. Recommend home w/ assist at d/c.  -[facility] Name 02/25/25 0970          Therapy Assessment/Plan (PT)    Patient/Family  Therapy Goals Statement (PT) to go home  -     Rehab Potential (PT) good  -     Criteria for Skilled Interventions Met (PT) yes;meets criteria;skilled treatment is necessary  -     Therapy Frequency (PT) daily  -     Predicted Duration of Therapy Intervention (PT) 5 days  -       Row Name 02/25/25 0937          Vital Signs    Pre Systolic BP Rehab 144  -     Pre Treatment Diastolic BP 78  -LH     Pre SpO2 (%) 96  -LH     O2 Delivery Pre Treatment room air  -     Post SpO2 (%) 95  -LH     O2 Delivery Post Treatment room air  -LH     Pre Patient Position Supine  -     Post Patient Position Sitting  -[facility] Name 02/25/25 0937          Positioning and Restraints    Pre-Treatment Position in bed  -     Post Treatment Position chair  -     In Chair notified nsg;reclined;sitting;call light within reach;encouraged to call for assist;exit alarm on;waffle cushion;legs elevated  -               User Key  (r) = Recorded By, (t) = Taken By, (c) = Cosigned By      Initials Name Provider Type     Ludy Bello, PT Physical Therapist                   Outcome Measures       [facility] Name 02/25/25 0943          How much help from another person do you currently need...    Turning from your back to your side while in flat bed without using bedrails? 3  -LH     Moving from lying on back to sitting on the side of a flat bed without bedrails? 3  -LH     Moving to and from a bed to a chair (including a wheelchair)? 3  -LH     Standing up from a chair using your arms (e.g., wheelchair, bedside chair)? 3  -LH     Climbing 3-5 steps with a railing? 3  -LH     To walk in hospital room? 3  -     AM-PAC 6 Clicks Score (PT) 18  -     Highest Level of Mobility Goal 6 --> Walk 10 steps or more  -       Row Name 02/25/25 0943 02/25/25 0927       Functional Assessment    Outcome Measure Options AM-PAC 6 Clicks Basic Mobility (PT)  - AM-PAC 6 Clicks Daily Activity (OT)  -TB              User Key  (r) = Recorded By,  (t) = Taken By, (c) = Cosigned By      Initials Name Provider Type    TB Anny Cardozo, OT Occupational Therapist     Ludy Bello, PT Physical Therapist                                 Physical Therapy Education       Title: PT OT SLP Therapies (In Progress)       Topic: Physical Therapy (Done)       Point: Mobility training (Done)       Learning Progress Summary            Patient Acceptance, E, VU,DU,NR by  at 2/25/2025 0823                      Point: Home exercise program (Done)       Learning Progress Summary            Patient Acceptance, E, VU,DU,NR by  at 2/25/2025 0823                      Point: Body mechanics (Done)       Learning Progress Summary            Patient Acceptance, E, VU,DU,NR by  at 2/25/2025 0823                      Point: Precautions (Done)       Learning Progress Summary            Patient Acceptance, E, VU,DU,NR by  at 2/25/2025 0823                                      User Key       Initials Effective Dates Name Provider Type Discipline     09/21/23 -  Ludy Bello, PT Physical Therapist PT                  PT Recommendation and Plan  Planned Therapy Interventions (PT): balance training, bed mobility training, gait training, home exercise program, neuromuscular re-education, patient/family education, postural re-education, ROM (range of motion), stair training, strengthening, stretching, transfer training  Outcome Evaluation: PT eval completed. Pt presents s/p lumbar fusion w/ deficits in strength, balance, and activity tolerance. Pt ambulated 350 ft w/ FWW, CGA. Pt educated on spinal precautions, log rolling technique, and HEP. Pt would benefit from IP PT services while hospitalized. Recommend home w/ assist at d/c.     Time Calculation:   PT Evaluation Complexity  History, PT Evaluation Complexity: 1-2 personal factors and/or comorbidities  Examination of Body Systems (PT Eval Complexity): total of 4 or more elements  Clinical Presentation (PT Evaluation  Complexity): evolving  Clinical Decision Making (PT Evaluation Complexity): low complexity  Overall Complexity (PT Evaluation Complexity): low complexity     PT Charges       Row Name 02/25/25 0944             Time Calculation    Start Time 0830  -      PT Received On 02/25/25  -      PT Goal Re-Cert Due Date 03/07/25  -         Untimed Charges    PT Eval/Re-eval Minutes 50  -LH         Total Minutes    Untimed Charges Total Minutes 50  -LH       Total Minutes 50  -LH                User Key  (r) = Recorded By, (t) = Taken By, (c) = Cosigned By      Initials Name Provider Type     Ludy Bello, PT Physical Therapist                  Therapy Charges for Today       Code Description Service Date Service Provider Modifiers Qty    [id number] HC PT EVAL LOW COMPLEXITY 4 2/25/2025 Ludy Bello, PT GP 1            PT G-Codes  Outcome Measure Options: AM-PAC 6 Clicks Basic Mobility (PT)  AM-PAC 6 Clicks Score (PT): 18  AM-PAC 6 Clicks Score (OT): 20  PT Discharge Summary  Anticipated Discharge Disposition (PT): home with assist    Ludy Bello PT  2/25/2025

## 2025-02-25 NOTE — PLAN OF CARE
Problem: Adult Inpatient Plan of Care  Goal: Absence of Hospital-Acquired Illness or Injury  Intervention: Identify and Manage Fall Risk  Recent Flowsheet Documentation  Taken 2/25/2025 0200 by Presley Dale RN  Safety Promotion/Fall Prevention: safety round/check completed  Taken 2/25/2025 0000 by Presley Dale RN  Safety Promotion/Fall Prevention: safety round/check completed  Taken 2/24/2025 2200 by Presley Dale RN  Safety Promotion/Fall Prevention: safety round/check completed  Intervention: Prevent Infection  Recent Flowsheet Documentation  Taken 2/24/2025 2154 by Presley Dale RN  Infection Prevention: hand hygiene promoted   Goal Outcome Evaluation:

## 2025-02-25 NOTE — PLAN OF CARE
Problem: Adult Inpatient Plan of Care  Goal: Plan of Care Review  Recent Flowsheet Documentation  Taken 2/25/2025 0921 by Anny Cardozo OT  Progress: improving  Outcome Evaluation: OT IE completed. Pt is A/Ox4 and motivated to work with therapy. Education reviewed for spinal precautions and log roll sequencing. Pt transitions to EOB sitting with SBA. BUE AROM, strength, and sensation are intact. Pt is up in room and transfering with RW support and CGAx1. No dizziness or LOB. Pt reports improved pain following OOB activity. Appropriate AE issued and teaching initiated for ADL retraining. OT will follow IP. Recommend home with spouse assist when pt is medically ready. No DME needs at this time.

## 2025-02-26 ENCOUNTER — TELEPHONE (OUTPATIENT)
Dept: NEUROSURGERY | Facility: CLINIC | Age: 65
End: 2025-02-26
Payer: MEDICARE

## 2025-02-26 VITALS
HEART RATE: 96 BPM | TEMPERATURE: 98.5 F | SYSTOLIC BLOOD PRESSURE: 162 MMHG | DIASTOLIC BLOOD PRESSURE: 100 MMHG | RESPIRATION RATE: 18 BRPM | OXYGEN SATURATION: 94 %

## 2025-02-26 DIAGNOSIS — M48.062 SPINAL STENOSIS OF LUMBAR REGION WITH NEUROGENIC CLAUDICATION: ICD-10-CM

## 2025-02-26 DIAGNOSIS — M48.062 LUMBAR STENOSIS WITH NEUROGENIC CLAUDICATION: Primary | ICD-10-CM

## 2025-02-26 LAB — GLUCOSE BLDC GLUCOMTR-MCNC: 226 MG/DL (ref 70–130)

## 2025-02-26 PROCEDURE — A9270 NON-COVERED ITEM OR SERVICE: HCPCS | Performed by: NEUROLOGICAL SURGERY

## 2025-02-26 PROCEDURE — 63710000001 PANTOPRAZOLE 40 MG TABLET DELAYED-RELEASE: Performed by: NEUROLOGICAL SURGERY

## 2025-02-26 PROCEDURE — 82948 REAGENT STRIP/BLOOD GLUCOSE: CPT

## 2025-02-26 PROCEDURE — 97116 GAIT TRAINING THERAPY: CPT

## 2025-02-26 PROCEDURE — 63710000001 SERTRALINE 100 MG TABLET: Performed by: NEUROLOGICAL SURGERY

## 2025-02-26 PROCEDURE — 63710000001 METFORMIN 1000 MG TABLET: Performed by: NEUROLOGICAL SURGERY

## 2025-02-26 PROCEDURE — 63710000001 GLIPIZIDE 5 MG TABLET: Performed by: NEUROLOGICAL SURGERY

## 2025-02-26 PROCEDURE — 63710000001 AMLODIPINE 10 MG TABLET: Performed by: NEUROLOGICAL SURGERY

## 2025-02-26 PROCEDURE — 63710000001 FUROSEMIDE 20 MG TABLET: Performed by: NEUROLOGICAL SURGERY

## 2025-02-26 PROCEDURE — 63710000001 POLYETHYLENE GLYCOL 17 G PACK: Performed by: NEUROLOGICAL SURGERY

## 2025-02-26 PROCEDURE — 97110 THERAPEUTIC EXERCISES: CPT

## 2025-02-26 PROCEDURE — 63710000001 OXYCODONE-ACETAMINOPHEN 7.5-325 MG TABLET: Performed by: NEUROLOGICAL SURGERY

## 2025-02-26 PROCEDURE — 63710000001 INSULIN LISPRO (HUMAN) PER 5 UNITS: Performed by: NEUROLOGICAL SURGERY

## 2025-02-26 PROCEDURE — 99024 POSTOP FOLLOW-UP VISIT: CPT | Performed by: NEUROLOGICAL SURGERY

## 2025-02-26 PROCEDURE — 63710000001 SENNOSIDES-DOCUSATE 8.6-50 MG TABLET: Performed by: NEUROLOGICAL SURGERY

## 2025-02-26 PROCEDURE — 63710000001 LISINOPRIL 40 MG TABLET: Performed by: NEUROLOGICAL SURGERY

## 2025-02-26 PROCEDURE — 63710000001 CETIRIZINE 10 MG TABLET: Performed by: NEUROLOGICAL SURGERY

## 2025-02-26 PROCEDURE — 63710000001 ASPIRIN 81 MG TABLET DELAYED-RELEASE: Performed by: NEUROLOGICAL SURGERY

## 2025-02-26 RX ORDER — OXYCODONE AND ACETAMINOPHEN 7.5; 325 MG/1; MG/1
1 TABLET ORAL 3 TIMES DAILY PRN
Qty: 15 TABLET | Refills: 0 | Status: SHIPPED | OUTPATIENT
Start: 2025-02-26

## 2025-02-26 RX ADMIN — FLUTICASONE PROPIONATE 2 SPRAY: 50 SPRAY, METERED NASAL at 09:46

## 2025-02-26 RX ADMIN — OXYCODONE AND ACETAMINOPHEN 1 TABLET: 7.5; 325 TABLET ORAL at 05:08

## 2025-02-26 RX ADMIN — SENNOSIDES AND DOCUSATE SODIUM 2 TABLET: 50; 8.6 TABLET ORAL at 09:40

## 2025-02-26 RX ADMIN — LISINOPRIL 40 MG: 40 TABLET ORAL at 09:40

## 2025-02-26 RX ADMIN — INSULIN LISPRO 4 UNITS: 100 INJECTION, SOLUTION INTRAVENOUS; SUBCUTANEOUS at 09:41

## 2025-02-26 RX ADMIN — PANTOPRAZOLE SODIUM 40 MG: 40 TABLET, DELAYED RELEASE ORAL at 05:08

## 2025-02-26 RX ADMIN — ASPIRIN 81 MG: 81 TABLET, COATED ORAL at 09:40

## 2025-02-26 RX ADMIN — FUROSEMIDE 20 MG: 20 TABLET ORAL at 09:40

## 2025-02-26 RX ADMIN — GLIPIZIDE 2.5 MG: 5 TABLET ORAL at 06:37

## 2025-02-26 RX ADMIN — AMLODIPINE BESYLATE 10 MG: 10 TABLET ORAL at 09:40

## 2025-02-26 RX ADMIN — POLYETHYLENE GLYCOL 3350 17 G: 17 POWDER, FOR SOLUTION ORAL at 09:41

## 2025-02-26 RX ADMIN — OXYCODONE AND ACETAMINOPHEN 1 TABLET: 7.5; 325 TABLET ORAL at 09:40

## 2025-02-26 RX ADMIN — SERTRALINE 200 MG: 100 TABLET, FILM COATED ORAL at 09:40

## 2025-02-26 RX ADMIN — METFORMIN HYDROCHLORIDE 1000 MG: 1000 TABLET ORAL at 09:40

## 2025-02-26 RX ADMIN — CETIRIZINE HYDROCHLORIDE 5 MG: 10 TABLET, FILM COATED ORAL at 09:40

## 2025-02-26 NOTE — PLAN OF CARE
Problem: Adult Inpatient Plan of Care  Goal: Plan of Care Review  Outcome: Progressing  Flowsheets  Taken 2/26/2025 0347 by Presley Dale RN  Progress: no change  Taken 2/25/2025 0937 by Ludy Bello PT  Plan of Care Reviewed With: patient  Goal: Patient-Specific Goal (Individualized)  Outcome: Progressing  Goal: Absence of Hospital-Acquired Illness or Injury  Outcome: Progressing  Intervention: Identify and Manage Fall Risk  Recent Flowsheet Documentation  Taken 2/26/2025 0200 by Presley Dale RN  Safety Promotion/Fall Prevention: safety round/check completed  Taken 2/26/2025 0000 by Presley Dale RN  Safety Promotion/Fall Prevention: safety round/check completed  Taken 2/25/2025 2200 by Presley Dale RN  Safety Promotion/Fall Prevention: safety round/check completed  Taken 2/25/2025 2000 by Presley Dale RN  Safety Promotion/Fall Prevention: safety round/check completed  Intervention: Prevent Skin Injury  Recent Flowsheet Documentation  Taken 2/25/2025 2000 by Presley Dale RN  Body Position: position changed independently  Goal: Optimal Comfort and Wellbeing  Outcome: Progressing  Goal: Readiness for Transition of Care  Outcome: Progressing     Problem: Fall Injury Risk  Goal: Absence of Fall and Fall-Related Injury  Outcome: Progressing  Intervention: Promote Injury-Free Environment  Recent Flowsheet Documentation  Taken 2/26/2025 0200 by Presley Dale RN  Safety Promotion/Fall Prevention: safety round/check completed  Taken 2/26/2025 0000 by Presley Dale RN  Safety Promotion/Fall Prevention: safety round/check completed  Taken 2/25/2025 2200 by Presley Dale RN  Safety Promotion/Fall Prevention: safety round/check completed  Taken 2/25/2025 2000 by Presley Dale RN  Safety Promotion/Fall Prevention: safety round/check completed   Goal Outcome Evaluation:           Progress: no change

## 2025-02-26 NOTE — PROGRESS NOTES
NEUROSURGERY PROGRESS NOTE     LOS: 1 day   Patient Care Team:  Felipa Pereira MD as PCP - General (Internal Medicine)  Felipa Pereira MD as Referring Physician (Internal Medicine)    Chief Complaint: Low back and lower extremity pain with walking and standing intolerance.    POD#: 2 Days Post-Op  Procedures:  L4-5 PLIF.  Removal of synovial cyst.    Interval History:   The patient ambulated in the [facility]aya yesterday.  Patient Complaints: Incisional pain.  Patient Denies: Voiding difficulty.    Vital Signs: Blood pressure 160/96, pulse 96, temperature 98.3 °F (36.8 °C), resp. rate 18, SpO2 94%.  Intake/Output:   Intake/Output Summary (Last 24 hours) at 2/26/2025 0683  Last data filed at 2/26/2025 0313  Gross per 24 hour   Intake 900 ml   Output 2670 ml   Net -1770 ml       Physical Exam:  Patient is awake and alert.  He is in good spirits.     Data Review:    Results from last 7 days   Lab Units 02/25/25  1420   HEMOGLOBIN g/dL 10.9*   HEMATOCRIT % 32.7*     Drain output: 90/80 mL.    Assessment/Plan:  1.  L4-5 spondylolisthesis, stenosis, synovial cyst, and instability status post decompression with fusion and stabilization.  2.  Diabetes mellitus.  3.  Hypertension.  4.  Hyperlipidemia.  5.  Obesity.  6.  Disposition: Drain out.  Home today.  Follow-up with OLIVER in my office in approximately 3 weeks.      Ellis Espinoza MD  02/26/25  06:35 EST

## 2025-02-26 NOTE — CASE MANAGEMENT/SOCIAL WORK
Continued Stay Note  [facility]     Patient Name: Delonte Hernandes  MRN: [id number]  Today's Date: 2/26/2025    Admit Date: 2/24/2025    Plan: Home   Discharge Plan       Row Name 02/26/25 1028       Plan    Plan Home    Patient/Family in Agreement with Plan yes    Plan Comments Patient in need of a rolling walker for home. [employer] delivered rolling walker to bedside. DCP is home with spouse - she will be able to assist with any needs and will provide transport.    Final Discharge Disposition Code 01 - home or self-care                   Discharge Codes    No documentation.                 Expected Discharge Date and Time       Expected Discharge Date Expected Discharge Time    Feb 26, 2025               Cady Mckee RN

## 2025-02-26 NOTE — THERAPY TREATMENT NOTE
Patient Name: Delonte Hernandes  : 1960    MRN: [id number]                              Today's Date: 2025       Admit Date: 2025    Visit Dx:     ICD-10-CM ICD-9-CM   1. Lumbar stenosis with neurogenic claudication  M48.062 724.03     Patient Active Problem List   Diagnosis    Synovial cyst    Facet arthritis of lumbar region    Status post lumbar laminectomy    Lumbar stenosis with neurogenic claudication     Past Medical History:   Diagnosis Date    Arthritis     Brain concussion     Cataract     bilat- still present - mild     Claustrophobia     Diabetes mellitus     checks sugar couple times per week     GERD (gastroesophageal reflux disease)     Headache     h/o    Hyperlipidemia     Hypertension     Infectious viral hepatitis     cure    Low back pain     Lumbosacral disc disease     Peripheral neuropathy     Sleep apnea     doesnt use machine     Teeth missing     Wears glasses      Past Surgical History:   Procedure Laterality Date    BACK SURGERY      FACIAL RECONSTRUCTION SURGERY      long time ago    LUMBAR DISCECTOMY Left 2019    Procedure: LUMBAR LAMINECTOMY L4-5 LEFT REMOVAL CYST;  Surgeon: Nav Douglass MD;  Location:  [facility];  Service: Neurosurgery    LUMBAR LAMINECTOMY WITH FUSION N/A 2025    Procedure: LUMBAR FUSION DECOMPRESSION WITH PEDICLE SCREWS L4-L5, REMOVAL OF SYNOVIAL CYST;  Surgeon: Ellis Espinoza MD;  Location:  [facility];  Service: Neurosurgery;  Laterality: N/A;    LUMBAR SYNOVIAL CYST REMOVAL      WISDOM TOOTH EXTRACTION        General Information       Row Name 25 7896          Physical Therapy Time and Intention    Document Type therapy note (daily note)  -CK     Mode of Treatment individual therapy;physical therapy  -CK       Row Name 25 0426          General Information    Patient Profile Reviewed yes  -CK     Existing Precautions/Restrictions fall;spinal;other (see comments)  hemovac  -CK     Barriers to Rehab none identified  -CK        Row Name 02/26/25 0850          Cognition    Orientation Status (Cognition) oriented x 4  -CK       Row Name 02/26/25 0850          Safety Issues/Impairments Affecting Functional Mobility    Safety Issues Affecting Function (Mobility) safety precaution awareness  -CK     Impairments Affecting Function (Mobility) balance;endurance/activity tolerance;pain;strength  -CK               User Key  (r) = Recorded By, (t) = Taken By, (c) = Cosigned By      Initials Name Provider Type    CK Martha Carrera PT Physical Therapist                   Mobility       Row Name 02/26/25 0850          Bed Mobility    Comment, (Bed Mobility) [facility] pre/post treatment, patient able to verbalize 3/3 spinal precautions and logroll technique  -CK       Row Name 02/26/25 0850          Sit-Stand Transfer    Sit-Stand [place] (Transfers) standby assist;verbal cues  -CK     Assistive Device (Sit-Stand Transfers) walker, front-wheeled  -CK     Comment, (Sit-Stand Transfer) cues to push up from armrest of chair  -CK       Row Name 02/26/25 0850          Gait/Stairs (Locomotion)    [place] Level (Gait) contact guard  -CK     Assistive Device (Gait) walker, front-wheeled  -CK     Distance in Feet (Gait) 500  -CK     Deviations/Abnormal Patterns (Gait) bilateral deviations;gait speed decreased;stride length decreased  -CK     Bilateral Gait Deviations forward flexed posture  -CK     Comment, (Gait/Stairs) Patient ambulated in [place] with step through gait pattern. Cues for upright posture with relaxed shoulders and closer positioning of walker. He reported so L hip pain and at times had decreased stance time on LLE but no overt LOB or buckling. He declined stair training as he is planning to use his ramp entry at D/C.  -CK               User Key  (r) = Recorded By, (t) = Taken By, (c) = Cosigned By      Initials Name Provider Type    CK Martha Carrera PT Physical Therapist                   Obj/Interventions       Row Name 02/26/25  0853          Motor Skills    Therapeutic Exercise hip;knee;ankle;other (see comments)  patient verbalized good understanding of Day 1 HEP. Reviewed day 2 as below and also including bilat shoulder flexion and BKFO both x10 reps  -CK       Row Name 02/26/25 0853          Hip (Therapeutic Exercise)    Hip (Therapeutic Exercise) strengthening exercise  -CK     Hip Strengthening (Therapeutic Exercise) bilateral;external rotation;internal rotation;heel slides;10 repetitions  -CK       Row Name 02/26/25 0853          Balance    Balance Assessment sitting static balance;standing static balance;standing dynamic balance  -CK     Static Sitting Balance independent  -CK     Position, Sitting Balance unsupported;sitting in chair  -CK     Static Standing Balance standby assist  -CK     Dynamic Standing Balance contact guard  -CK     Position/Device Used, Standing Balance supported;walker, front-wheeled  -CK     Comment, Balance no LOB or buckling  -CK               User Key  (r) = Recorded By, (t) = Taken By, (c) = Cosigned By      Initials Name Provider Type    CK Martha Carrera, ROSLYN Physical Therapist                   Goals/Plan    No documentation.                  Clinical Impression       Row Name 02/26/25 0854          Pain    Pretreatment Pain Rating 7/10  -CK     Posttreatment Pain Rating 7/10  -CK     Pain Location back  -CK     Pain Side/Orientation lower  -CK     Pain Management Interventions exercise or physical activity utilized;positioning techniques utilized;nursing notified  -CK     Response to Pain Interventions activity participation with tolerable pain  -CK       Row Name 02/26/25 0854          Plan of Care Review    Plan of Care Reviewed With patient;spouse  -CK     Progress improving  -CK     Outcome Evaluation Patient able to increase ambulation distance to 500' CGA with FWW with no LOB or buckling. Patient continues to report improved BLE pain. He was able to verbalize his spinal precautions and  logroll technique. Day 2 HEP reviewed with him. IPPT remains indicated to address current deficits. Continue to recommend D/C home with assist when medically appropriate. Recommend he D/C with a FWW.  -CK       Row Name 02/26/25 0854          Vital Signs    O2 Delivery Pre Treatment room air  -CK     O2 Delivery Intra Treatment room air  -CK     O2 Delivery Post Treatment room air  -CK     Pre Patient Position Sitting  -CK     Post Patient Position Sitting  -CK       Row Name 02/26/25 0854          Positioning and Restraints    Pre-Treatment Position sitting in chair/recliner  -CK     Post Treatment Position chair  -CK     In Chair reclined;call light within reach;encouraged to call for assist;notified nsg  alarm unchanged  -CK               User Key  (r) = Recorded By, (t) = Taken By, (c) = Cosigned By      Initials Name Provider Type    Martha Santana PT Physical Therapist                   Outcome Measures       Row Name 02/26/25 0857          How much help from another person do you currently need...    Turning from your back to your side while in flat bed without using bedrails? 4  -CK     Moving from lying on back to sitting on the side of a flat bed without bedrails? 3  -CK     Moving to and from a bed to a chair (including a wheelchair)? 3  -CK     Standing up from a chair using your arms (e.g., wheelchair, bedside chair)? 3  -CK     Climbing 3-5 steps with a railing? 3  -CK     To walk in hospital room? 3  -CK     AM-PAC 6 Clicks Score (PT) 19  -CK     Highest Level of Mobility Goal 6 --> Walk 10 steps or more  -CK       Row Name 02/26/25 0857          Functional Assessment    Outcome Measure Options AM-PAC 6 Clicks Basic Mobility (PT)  -CK               User Key  (r) = Recorded By, (t) = Taken By, (c) = Cosigned By      Initials Name Provider Type    Martha Santana PT Physical Therapist                                 Physical Therapy Education       Title: PT OT SLP Therapies (Done)        Topic: Physical Therapy (Done)       Point: Mobility training (Done)       Learning Progress Summary            Patient Acceptance, E, VU by  at 2/26/2025 0857    Acceptance, E, VU,DU,NR by  at 2/25/2025 0823                      Point: Home exercise program (Done)       Learning Progress Summary            Patient Acceptance, E, VU by  at 2/26/2025 0857    Acceptance, E, VU,DU,NR by  at 2/25/2025 0823                      Point: Body mechanics (Done)       Learning Progress Summary            Patient Acceptance, E, VU by  at 2/26/2025 0857    Acceptance, E, VU,DU,NR by  at 2/25/2025 0823                      Point: Precautions (Done)       Learning Progress Summary            Patient Acceptance, E, VU by  at 2/26/2025 0857    Acceptance, E, VU,DU,NR by  at 2/25/2025 0823                                      User Key       Initials Effective Dates Name Provider Type Discipline     02/06/24 -  Martha Carrera, PT Physical Therapist PT     09/21/23 -  Ludy Bello, PT Physical Therapist PT                  PT Recommendation and Plan     Progress: improving  Outcome Evaluation: Patient able to increase ambulation distance to 500' CGA with FWW with no LOB or buckling. Patient continues to report improved BLE pain. He was able to verbalize his spinal precautions and logroll technique. Day 2 HEP reviewed with him. IPPT remains indicated to address current deficits. Continue to recommend D/C home with assist when medically appropriate. Recommend he D/C with a FWW.     Time Calculation:         PT Charges       Row Name 02/26/25 0857             Time Calculation    Start Time 0825  -CK      PT Received On 02/26/25  -CK         Timed Charges    45286 - PT Therapeutic Exercise Minutes 13  -CK      91592 - Gait Training Minutes  10  -CK         Total Minutes    Timed Charges Total Minutes 23  -CK       Total Minutes 23  -CK                User Key  (r) = Recorded By, (t) = Taken By, (c) = Cosigned By       Initials Name Provider Type    CK Martha Carrera, ROSLYN Physical Therapist                  Therapy Charges for Today       Code Description Service Date Service Provider Modifiers Qty    97150587400 HC PT THER PROC EA 15 MIN 2/26/2025 Martha Carrera, PT GP 1    15577744724 HC GAIT TRAINING EA 15 MIN 2/26/2025 Martha Carrera, PT GP 1            PT G-Codes  Outcome Measure Options: AM-PAC 6 Clicks Basic Mobility (PT)  AM-PAC 6 Clicks Score (PT): 19  AM-PAC 6 Clicks Score (OT): 20  PT Discharge Summary  Anticipated Discharge Disposition (PT): home with assist    Martha Carrera PT  2/26/2025

## 2025-02-26 NOTE — PLAN OF CARE
Goal Outcome Evaluation:  Plan of Care Reviewed With: patient, spouse        Progress: improving  Outcome Evaluation: Patient able to increase ambulation distance to 500' CGA with FWW with no LOB or buckling. Patient continues to report improved BLE pain. He was able to verbalize his spinal precautions and logroll technique. Day 2 HEP reviewed with him. IPPT remains indicated to address current deficits. Continue to recommend D/C home with assist when medically appropriate. Recommend he D/C with a FWW.    Anticipated Discharge Disposition (PT): home with assist

## 2025-03-03 NOTE — PROGRESS NOTES
The patient left the office before care was provided and did not complete the visit. Patient did not have MRI imaging and chose to reschedule to save a copay.

## 2025-03-18 ENCOUNTER — HOSPITAL ENCOUNTER (OUTPATIENT)
Dept: GENERAL RADIOLOGY | Facility: HOSPITAL | Age: 65
Discharge: HOME | End: 2025-03-18
Admitting: NEUROLOGICAL SURGERY
Payer: MEDICARE

## 2025-03-18 DIAGNOSIS — M48.062 LUMBAR STENOSIS WITH NEUROGENIC CLAUDICATION: ICD-10-CM

## 2025-03-18 DIAGNOSIS — M48.062 SPINAL STENOSIS OF LUMBAR REGION WITH NEUROGENIC CLAUDICATION: ICD-10-CM

## 2025-03-18 PROCEDURE — 72100 X-RAY EXAM L-S SPINE 2/3 VWS: CPT

## 2025-03-19 ENCOUNTER — OFFICE VISIT (OUTPATIENT)
Dept: NEUROSURGERY | Facility: CLINIC | Age: 65
End: 2025-03-19
Payer: MEDICARE

## 2025-03-19 VITALS
BODY MASS INDEX: 29.52 KG/M2 | HEIGHT: 68 IN | DIASTOLIC BLOOD PRESSURE: 72 MMHG | SYSTOLIC BLOOD PRESSURE: 130 MMHG | WEIGHT: 194.8 LBS | TEMPERATURE: 97.1 F

## 2025-03-19 DIAGNOSIS — M43.16 SPONDYLOLISTHESIS OF LUMBAR REGION: ICD-10-CM

## 2025-03-19 DIAGNOSIS — M48.062 LUMBAR STENOSIS WITH NEUROGENIC CLAUDICATION: Primary | ICD-10-CM

## 2025-03-19 DIAGNOSIS — M51.9 LUMBAR DISC DISEASE: ICD-10-CM

## 2025-03-19 PROCEDURE — 1160F RVW MEDS BY RX/DR IN RCRD: CPT | Performed by: PHYSICIAN ASSISTANT

## 2025-03-19 PROCEDURE — 1159F MED LIST DOCD IN RCRD: CPT | Performed by: PHYSICIAN ASSISTANT

## 2025-03-19 PROCEDURE — 99024 POSTOP FOLLOW-UP VISIT: CPT | Performed by: PHYSICIAN ASSISTANT

## 2025-03-19 RX ORDER — GLIMEPIRIDE 4 MG/1
1 TABLET ORAL DAILY
COMMUNITY
Start: 2025-02-26

## 2025-03-19 RX ORDER — LISINOPRIL 20 MG/1
1 TABLET ORAL EVERY 12 HOURS SCHEDULED
COMMUNITY
Start: 2025-03-06

## 2025-03-19 RX ORDER — BUPROPION HYDROCHLORIDE 300 MG/1
300 TABLET ORAL EVERY MORNING
COMMUNITY
Start: 2025-02-18

## 2025-03-19 RX ORDER — MELOXICAM 15 MG/1
1 TABLET ORAL DAILY
COMMUNITY
Start: 2025-02-26

## 2025-03-19 NOTE — PROGRESS NOTES
Patient: Delonte Hernandes  : 1960  Chart #: [id number]    Date of Service: 2025    CHIEF COMPLAINT:   L4-5 spondylolisthesis, stenosis, instability  Right L4-5 synovial cyst    History of Present Illness Mr. Hernandes is a very pleasant 64-year-old gentleman who remotely underwent lumbar decompression on the left at L4-5 by Dr. Douglass.  More recently he developed progressive back and lower extremity pain.  He was found to have spondylolisthesis at L4-5 with high-grade stenosis that in part was due to a large right sided synovial cyst.  Given the above, on 2025 he underwent decompression, fusion and stabilization L4-5  Today patient is 3 weeks postop and reports doing much better.  He continues with some low-grade symptoms into the legs but he feels 90% better than he did prior to surgery.  No incisional issues.      Past Medical History:   Diagnosis Date    Arthritis     Brain concussion     Cataract     bilat- still present - mild     Claustrophobia     Diabetes mellitus     checks sugar couple times per week     GERD (gastroesophageal reflux disease)     Headache     h/o    Hyperlipidemia     Hypertension     Infectious viral hepatitis     cure    Low back pain     Lumbosacral disc disease     Peripheral neuropathy     Sleep apnea     doesnt use machine     Teeth missing     Wears glasses          Current Outpatient Medications:     amLODIPine (NORVASC) 10 MG tablet, Take 1 tablet by mouth Daily., Disp: , Rfl:     aspirin 81 MG EC tablet, Take 1 tablet by mouth Daily., Disp: , Rfl:     buPROPion XL (WELLBUTRIN XL) 300 MG 24 hr tablet, Take 1 tablet by mouth Every Morning., Disp: , Rfl:     docusate sodium (COLACE) 100 MG capsule, Take 1 capsule by mouth 2 (Two) Times a Day., Disp: , Rfl:     fexofenadine (ALLEGRA) 180 MG tablet, Take 1 tablet by mouth Daily., Disp: , Rfl:     fluticasone (FLONASE) 50 MCG/ACT nasal spray, 2 sprays by Each Nare route Daily., Disp: , Rfl:     furosemide (LASIX) 20 MG  tablet, Take 1 tablet by mouth Daily., Disp: , Rfl:     glimepiride (AMARYL) 4 MG tablet, Take 1 tablet by mouth Daily., Disp: , Rfl:     lisinopril (PRINIVIL,ZESTRIL) 20 MG tablet, Take 1 tablet by mouth Every 12 (Twelve) Hours., Disp: , Rfl:     meloxicam (MOBIC) 15 MG tablet, Take 1 tablet by mouth Daily., Disp: , Rfl:     metFORMIN (GLUCOPHAGE) 1000 MG tablet, Take 1 tablet by mouth 2 (Two) Times a Day With Meals., Disp: , Rfl:     Misc Natural Products (GLUCOSAMINE CHONDROITIN ADV PO), Take 2 tablets by mouth Every Evening., Disp: , Rfl:     naloxone (NARCAN) 4 MG/0.1ML nasal spray, Call 911. Don't prime. [place] in 1 nostril for overdose. Repeat in 2-3 minutes in other nostril if no or minimal breathing/responsiveness., Disp: 2 each, Rfl: 0    omeprazole (priLOSEC) 20 MG capsule, Take 1 capsule by mouth Daily., Disp: , Rfl:     oxyCODONE-acetaminophen (PERCOCET) 7.5-325 MG per tablet, Take 1 tablet by mouth 3 (Three) Times a Day As Needed for Severe Pain., Disp: 15 tablet, Rfl: 0    Polyethylene Glycol 3350 (MIRALAX PO), Take 1 packet by mouth As Needed., Disp: , Rfl:     pravastatin (PRAVACHOL) 20 MG tablet, Take 1 tablet by mouth Daily., Disp: , Rfl:     sertraline (ZOLOFT) 100 MG tablet, Take 2 tablets by mouth Daily., Disp: , Rfl:     vitamin B-12 (CYANOCOBALAMIN) 2500 MCG sublingual tablet tablet, Take 2,500 mcg by mouth Daily., Disp: , Rfl:     Past Surgical History:   Procedure Laterality Date    BACK SURGERY      FACIAL RECONSTRUCTION SURGERY      long time ago    LUMBAR DISCECTOMY Left 11/08/2019    Procedure: LUMBAR LAMINECTOMY L4-5 LEFT REMOVAL CYST;  Surgeon: Nav Douglass MD;  Location:  [facility];  Service: Neurosurgery    LUMBAR LAMINECTOMY WITH FUSION N/A 2/24/2025    Procedure: LUMBAR FUSION DECOMPRESSION WITH PEDICLE SCREWS L4-L5, REMOVAL OF SYNOVIAL CYST;  Surgeon: Ellis Espinoza MD;  Location:  [facility];  Service: Neurosurgery;  Laterality: N/A;    LUMBAR SYNOVIAL CYST REMOVAL      WISDOM  TOOTH EXTRACTION         Social History     Socioeconomic History    Marital status:    Tobacco Use    Smoking status: Former     Current packs/day: 0.00     Average packs/day: 0.5 packs/day for 27.0 years (13.5 ttl pk-yrs)     Types: Cigarettes     Start date: 1977     Quit date:      Years since quittin.2     Passive exposure: Past    Smokeless tobacco: Never   Vaping Use    Vaping status: Never Used   Substance and Sexual Activity    Alcohol use: No    Drug use: Yes     Types: Marijuana    Sexual activity: Not Currently     Partners: Female     Birth control/protection: None         Review of Systems   Constitutional:  Negative for activity change, appetite change, chills, diaphoresis, fatigue, fever and unexpected weight change.   HENT:  Negative for congestion, dental problem, drooling, ear discharge, ear pain, facial swelling, hearing loss, mouth sores, nosebleeds, postnasal drip, rhinorrhea, sinus pressure, sinus pain, sneezing, sore throat, tinnitus, trouble swallowing and voice change.    Eyes:  Negative for photophobia, pain, discharge, redness, itching and visual disturbance.   Respiratory:  Negative for apnea, cough, choking, chest tightness, shortness of breath, wheezing and stridor.    Cardiovascular:  Negative for chest pain, palpitations and leg swelling.   Gastrointestinal:  Negative for abdominal distention, abdominal pain, anal bleeding, blood in stool, constipation, diarrhea, nausea, rectal pain and vomiting.   Endocrine: Negative for cold intolerance, heat intolerance, polydipsia, polyphagia and polyuria.   Genitourinary:  Negative for decreased urine volume, difficulty urinating, dysuria, enuresis, flank pain, frequency, genital sores, hematuria, penile discharge, penile pain, penile swelling, scrotal swelling, testicular pain and urgency.   Musculoskeletal:  Negative for arthralgias, back pain, gait problem, joint swelling, myalgias, neck pain and neck stiffness.   Skin:   "Negative for color change, pallor, rash and wound.   Allergic/Immunologic: Negative for environmental allergies, food allergies and immunocompromised state.   Neurological:  Negative for dizziness, tremors, seizures, syncope, facial asymmetry, speech difficulty, weakness, light-headedness, numbness and headaches.   Hematological:  Negative for adenopathy. Does not bruise/bleed easily.   Psychiatric/Behavioral:  Negative for agitation, behavioral problems, confusion, decreased concentration, dysphoric mood, hallucinations, self-injury, sleep disturbance and suicidal ideas. The patient is not nervous/anxious and is not hyperactive.        Objective   Vital Signs: Blood pressure 130/72, temperature 97.1 °F (36.2 °C), temperature source Infrared, height 172.7 cm (68\"), weight 88.4 kg (194 lb 12.8 oz).  Physical Exam  Vitals and nursing note reviewed.   Constitutional:       General: He is not in acute distress.     Appearance: He is well-developed.   HENT:      Head: Normocephalic and atraumatic.   Skin:     Comments: Well-healed lumbar incision   Psychiatric:         Behavior: Behavior normal.         Thought Content: Thought content normal.          Independent review of radiographic imaging: Plain films of the lumbar spine demonstrate construct intact L4-5    Assessment & Plan   Diagnosis: L4-5 spondylolisthesis, stenosis, synovial cyst, and instability status post decompression with fusion and stabilization    Medical Decision Making: Patient is 3 weeks post-op and doing very well. He has some low grade symptoms but overall is managing. He is very pleased with his progress. He may advance activities as tolerated. Follow up with Dr Espinoza in 6 weeks    Diagnoses and all orders for this visit:    1. Lumbar stenosis with neurogenic claudication (Primary)    2. Spondylolisthesis of lumbar region    3. Lumbar disc disease                        Cristel Osorio PA-C  Patient Care Team:  Felipa Pereira MD as PCP " - General (Internal Medicine)  Felipa Pereira MD as Referring Physician (Internal Medicine)

## (undated) DEVICE — SNAP KOVER: Brand: UNBRANDED

## (undated) DEVICE — KT DRN EVAC WND PVC PCH WTROC RND 10F400

## (undated) DEVICE — DRSNG WND BORDR/ADHS NONADHR/GZ LF 4X4IN STRL

## (undated) DEVICE — SUT VIC 0 CTD BR 18IN UNDYE VCP724D

## (undated) DEVICE — DRP C/ARMOR

## (undated) DEVICE — DRP CASSET 10 RAY LG 24X40

## (undated) DEVICE — PK MED 10

## (undated) DEVICE — BLANKT WARM UPPR/BDY ARM/OUT 57X196CM

## (undated) DEVICE — OIL CARTRIDGE: Brand: CORE, MAESTRO

## (undated) DEVICE — SUT VIC 0 UR6 27IN VCP603H

## (undated) DEVICE — 3M™ WARMING BLANKET, UPPER BODY, 10 PER CASE, 42268: Brand: BAIR HUGGER™

## (undated) DEVICE — HOLDER: Brand: DEROYAL

## (undated) DEVICE — GLV SURG PREMIERPRO MIC LTX PF SZ6.5 BRN

## (undated) DEVICE — GLV SURG PREMIERPRO MIC LTX PF SZ6 BRN

## (undated) DEVICE — DRSNG WND GZ PAD BORDERED 4X8IN STRL

## (undated) DEVICE — JACKSON TABLE POSITIONER KIT: Brand: MEDLINE INDUSTRIES, INC.

## (undated) DEVICE — STRAP POSTN KN/BDY FM 5X72IN DISP

## (undated) DEVICE — CVR HNDL LT SURG ACCSSRY BLU STRL

## (undated) DEVICE — SUT VIC PLS CTD ANTIB BR 3/0 8/18IN 45CM

## (undated) DEVICE — INSTRUMENT 9560702 RADIANCE ILLUMIN SYS: Brand: METRX® SYSTEM

## (undated) DEVICE — SYR LUERLOK 30CC

## (undated) DEVICE — DIFFUSER: Brand: CORE, MAESTRO

## (undated) DEVICE — PATIENT RETURN ELECTRODE, SINGLE-USE, CONTACT QUALITY MONITORING, ADULT, WITH 9FT CORD, FOR PATIENTS WEIGING OVER 33LBS. (15KG): Brand: MEGADYNE

## (undated) DEVICE — ANTIBACTERIAL UNDYED BRAIDED (POLYGLACTIN 910), SYNTHETIC ABSORBABLE SUTURE: Brand: COATED VICRYL

## (undated) DEVICE — THE MILL DISPOSABLE - FINE

## (undated) DEVICE — SHEET,DRAPE,40X58,STERILE: Brand: MEDLINE

## (undated) DEVICE — SUT SILK 2/0 PS 18IN 1588H

## (undated) DEVICE — SKIN AFFIX SURG ADHESIVE 72/CS 0.55ML: Brand: MEDLINE

## (undated) DEVICE — TRAP,MUCUS SPECIMEN,40CC: Brand: MEDLINE

## (undated) DEVICE — PK NEURO DISC 10

## (undated) DEVICE — GLV SURG PREMIERPRO MIC LTX PF SZ8 BRN

## (undated) DEVICE — STRIP,CLOSURE,WOUND,MEDI-STRIP,1/2X4: Brand: MEDLINE

## (undated) DEVICE — SPHR MARKR STEALTH STATION

## (undated) DEVICE — GOWN,REINFORCE,POLY,SIRUS,BREATH SLV,XLG: Brand: MEDLINE

## (undated) DEVICE — HDRST INTUB GENTLETOUCH SLOT 7IN RT

## (undated) DEVICE — ADHS LIQ MASTISOL 2/3ML

## (undated) DEVICE — GLV SURG PREMIERPRO MIC LTX PF SZ8.5 BRN

## (undated) DEVICE — GLV SURG PREMIERPRO MIC LTX PF SZ7.5 BRN

## (undated) DEVICE — TOOL MR8-14MH30T MR8 14CM MH SYMTRI 3MM: Brand: MIDAS REX MR8

## (undated) DEVICE — CONN TBG Y 5 IN 1 LF STRL

## (undated) DEVICE — PACK,UNIVERSAL,NO GOWNS: Brand: MEDLINE

## (undated) DEVICE — 3.0MM PRECISION NEURO (MATCH HEAD)

## (undated) DEVICE — IRRIGATOR BULB ASEPTO 60CC STRL